# Patient Record
Sex: FEMALE | Race: WHITE | NOT HISPANIC OR LATINO | URBAN - METROPOLITAN AREA
[De-identification: names, ages, dates, MRNs, and addresses within clinical notes are randomized per-mention and may not be internally consistent; named-entity substitution may affect disease eponyms.]

---

## 2023-09-14 ENCOUNTER — OFFICE VISIT (OUTPATIENT)
Dept: PSYCHIATRY | Facility: CLINIC | Age: 33
End: 2023-09-14
Payer: OTHER GOVERNMENT

## 2023-09-14 VITALS
DIASTOLIC BLOOD PRESSURE: 88 MMHG | HEIGHT: 66 IN | HEART RATE: 74 BPM | WEIGHT: 149 LBS | SYSTOLIC BLOOD PRESSURE: 131 MMHG | BODY MASS INDEX: 23.95 KG/M2

## 2023-09-14 DIAGNOSIS — F90.9 ATTENTION DEFICIT HYPERACTIVITY DISORDER (ADHD), UNSPECIFIED ADHD TYPE: ICD-10-CM

## 2023-09-14 DIAGNOSIS — F41.1 GAD (GENERALIZED ANXIETY DISORDER): ICD-10-CM

## 2023-09-14 DIAGNOSIS — F33.1 MAJOR DEPRESSIVE DISORDER, RECURRENT EPISODE, MODERATE: Primary | ICD-10-CM

## 2023-09-14 RX ORDER — BUPROPION HYDROCHLORIDE 150 MG/1
150 TABLET ORAL EVERY MORNING
Qty: 30 TABLET | Refills: 1 | Status: SHIPPED | OUTPATIENT
Start: 2023-09-14

## 2023-09-14 RX ORDER — METHYLPREDNISOLONE ACETATE 80 MG/ML
INJECTION, SUSPENSION INTRA-ARTICULAR; INTRALESIONAL; INTRAMUSCULAR; SOFT TISSUE
COMMUNITY
Start: 2023-08-03 | End: 2023-09-14

## 2023-09-14 NOTE — PROGRESS NOTES
"[facility]  Initial Evaluation    Referring Provider:  Tanya Justin, APRN  [address] [place] [address]    Chief Complaint: \"Just having a hard time since the move... I lived in [place] for about 9 years, my  is ex-... I had therapy there... when he was out I couldn't go anymore... I had a community of friends and I was pretty involved with the community... I focused on a lot of wellness activities... to manage feeling overwhelmed... I had those resources available... and then we moved... I'm working in a different environment... I'm working third shift... it's been harder to find time to prioritize those things.\"    History of Present Illness: Rosemary Tesfaye is a 33 y.o. female who presents today for initial evaluation regarding anxious, concentration/focus, and depressive symptoms. She presents unaccompanied in no acute distress and engages with me appropriately.     History is positive for signs/symptoms suggestive of MDD, MARTI, ADHD: low mood, low energy, anhedonia, changes in sleep, changes in appetite, guilt, poor concentration, psychomotor changes, thoughts of being better off dead, consistent and excessive worry across several domains of life that contributes to tension and irritability throughout the day, trouble concentrating, trouble completing tasks, making errors in routine tasks, issues remembering obligations, trouble with organization, fidgeting, and associated irritability/anxiety with these deficits. No recent SI. Patient describes a fulfilling, engaging life in [place] that has since changed drastically since moving to [place] - she is now working third shift and is far less able to engage socially and with wellness activities that helped her to manage mood and anxiety int he past. She doesn't have as many social contacts here and isn't as in touch with wellness resources in the area as she had been in [place]. She is " motivated to see a change in herself, but feels very overwhelmed with the stress in her life at this time.     With regard to ADHD: I am presumptively treating patient for this issue; history as provided today, presentation today, and positive family history would suggest a distinct possibility this is a contributing factor to patient's dysfunction in her roles and engagements irrespective to screening results. Patient has learned and successfully implemented compensatory coping skills that, with the Congregational of layered stressors throughout adulthood, have begun to fail. Treating ADHD symptoms will likely be a concise and appropriate route to address anxiety and mood issues that are, at least to some degree, secondary to deficits related to traits of ADHD.    I have counseled the patient with regard to diagnoses and the recommended treatment regimen as documented below: I will be prescribing bupropion for MDD and ADHD. The patient has been made aware that this agent diminishes the seizure threshold and can increase risk for seizures in susceptible populations. More common SE - dry mouth, GI upset, insomnia, hypertension - have also been reviewed. I've advised antidepressants carry a possibility of exacerbating mood/anxiety issues for which they are prescribed to the degree that, in some cases, their use may lead to acute suicidality. Patient contracts for safety appropriately. Patient has been made aware of the relatively sparse data regarding use of this agent during pregnancy/breastfeeding and that individual determination of risk vs benefit must be considered in these scenarios. Patient acknowledges the diagnoses per my rendered interpretation. Patient demonstrates awareness/understanding of viable alternatives for treatment as well as potential risks, benefits, and side effects associated with this regimen and is amenable to proceed in this fashion.     Recommended lifestyle changes: increase socializing (twice  "monthly at cross country meets), brisk 30 minute walks 3 days a week.    Psychiatric History:  Diagnoses: depression, anxiety  [facility] [employer] [facility]  Inpatient history: denies  Medication trials: \"I've always kind of rejected it... wanted to prioritize other forms of management... 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  Education: bachelor's degree  Pertinent developmental history: struggled some with reading, no formal LD or developmental issues; early exposure to items beyond her level of maturity, no direct abuse hx  Pertinent legal history: denies  Hobbies/interests: farming and raising/milking goats, gardening  Sabianist: \"used to be, but no\" (this changed about two years ago); felt her earlene was around 7-8/10 non-Mormonism Denominational; gradual decline in this belief system for her thereafter  Exercise: not recently, but yes as of a few months ago  Dietary habits: defer  Sleep hygiene: defer  Social habits: hasn't had as much contact with friends or social opportunities since moving to [place]  Sunlight: no concern for under-exposure  Caffeine intake: 4 cups of coffee daily on average, tea in the winter, no soda, no energy drinks  Hydration habits: no pertinent issues   history: denies    Social History     Socioeconomic History    Marital status: Unknown   Tobacco Use    Smoking status: Never   Vaping Use    Vaping Use: Never used   Substance and Sexual Activity    Alcohol use: Yes     Alcohol/week: 3.0 standard " drinks     Types: 3 Cans of beer per week    Drug use: Not Currently     Types: Marijuana    Sexual activity: Yes     Partners: Male     Birth control/protection: I.U.D.     Access to Firearms: yes; these are her 's guns in a safe.    Tobacco use counseling/intervention: N/A, patient does not use tobacco; patient was counseled with regard to risks of tobacco use.    PHQ-9 Depression Screening  PHQ-9 Total Score: 16    Little interest or pleasure in doing things? 1-->several days   Feeling down, depressed, or hopeless? 1-->several days   Trouble falling or staying asleep, or sleeping too much? 2-->more than half the days   Feeling tired or having little energy? 3-->nearly every day   Poor appetite or overeating? 2-->more than half the days   Feeling bad about yourself - or that you are a failure or have let yourself or your family down? 2-->more than half the days   Trouble concentrating on things, such as reading the newspaper or watching television? 3-->nearly every day   Moving or speaking so slowly that other people could have noticed? Or the opposite - being so fidgety or restless that you have been moving around a lot more than usual? 2-->more than half the days   Thoughts that you would be better off dead, or of hurting yourself in some way? 0-->not at all   PHQ-9 Total Score 16     MARTI-7  Feeling nervous, anxious or on edge: Nearly every day  Not being able to stop or control worrying: Nearly every day  Worrying too much about different things: Several days  Trouble Relaxing: Several days  Being so restless that it is hard to sit still: Nearly every day  Feeling afraid as if something awful might happen: More than half the days  Becoming easily annoyed or irritable: Not at all  MARTI 7 Total Score: 13  If you checked any problems, how difficult have these problems made it for you to do your work, take care of things at home, or get along with other people: Somewhat difficult    ASRS-v1.1  Part  A  5/6  Part B  6/12    Total  11/18    Problem List:  Patient Active Problem List   Diagnosis    Major depressive disorder, recurrent episode, moderate    MARTI (generalized anxiety disorder)    Attention deficit hyperactivity disorder (ADHD)     Allergy:   No Known Allergies     Discontinued Medications:  Medications Discontinued During This Encounter   Medication Reason    methylPREDNISolone acetate (DEPO-Medrol) 80 MG/ML injection *Therapy completed     Current Medications:   Current Outpatient Medications   Medication Sig Dispense Refill    Levonorgestrel (MIRENA, 52 MG, IU) by Intrauterine route.       No current facility-administered medications for this visit.     Past Medical History:  Past Medical History:   Diagnosis Date    ADHD (attention deficit hyperactivity disorder) 10/2017    Was discussed as a diagnosis during session. I opted to avoid medicated treatment.    Anxiety 08/2017    Depression 08/2017    Had episode of Suicidal thoughts and made an appointment.  Attended bi-monthly Sessions with Therapist    Self-injurious behavior 2005    behavior continued for 2 years,     Past Surgical History:  Past Surgical History:   Procedure Laterality Date    COSMETIC SURGERY  04/2016     Family History:   Family History   Problem Relation Age of Onset    ADD / ADHD Mother         Diagnosed at 42.  Was placed on medication to alleviate symptoms, managed sobriety for two years following medical intervention.  Was no longer complaint with medication due to peer pressure.    Alcohol abuse Mother     Depression Mother     Self-Injurious Behavior  Mother     Suicide Attempts Mother         Suicide attempt via alcohol consumption    ADD / ADHD Sister         Recent diagnosis following anxiety attack    Anxiety disorder Sister     Self-Injurious Behavior  Sister      Mental Status Exam:   Appearance: well-groomed, sits upright, age-appropriate, normal habitus  Behavior: nervous, cooperative, appropriate in demeanor,  "appropriate eye-contact  Mood/affect: dysphoric / mood-congruent and appropriate in both range and amplitude  Speech: loquacious, otherwise within expected variance; appropriate rate, appropriate rhythm, appropriate tone; non-pressured  Thought Process: linear, goal-directed; no FOI or CRYSTAL; abstraction intact  Thought Content: coherent, devoid of overt delusions/perceptual disturbances  SI/HI: denies both SI and HI; exhibits future-orientation, self-advocates appropriately, no regular self-harm, no appreciable intent  Memory: no overt deficits  Orientation: oriented to person/place/time/situation  Concentration: appropriate during interview  Intellectual capacity: presumptively average  Insight: fair by given history/exam  Judgment: appropriate by given history/exam  Psychomotor: fidgets  Gait: WNL    Review of Systems:  Review of Systems   Constitutional:  Negative for activity change, appetite change and unexpected weight change.   HENT:  Negative for drooling.    Eyes:  Negative for visual disturbance.   Respiratory:  Positive for shortness of breath. Negative for chest tightness.    Cardiovascular:  Negative for chest pain and palpitations.   Gastrointestinal:  Negative for abdominal pain, diarrhea and nausea.   Endocrine: Negative for cold intolerance and heat intolerance.   Genitourinary:  Negative for difficulty urinating and frequency.   Musculoskeletal:  Negative for myalgias and neck stiffness.   Skin:  Negative for rash.   Neurological:  Negative for dizziness, tremors, seizures and light-headedness.      Vital Signs:   /88   Pulse 74   Ht 167.6 cm (66\")   Wt 67.6 kg (149 lb)   BMI 24.05 kg/m²      Lab Results:   No visits with results within 6 Month(s) from this visit.   Latest known visit with results is:   No results found for any previous visit.     EKG Results:  No orders to display     Imaging Results:  No Images in the past 120 days found.    ASSESSMENT AND PLAN:    ICD-10-CM ICD-9-CM "   1. Major depressive disorder, recurrent episode, moderate  F33.1 296.32   2. MARTI (generalized anxiety disorder)  F41.1 300.02   3. Attention deficit hyperactivity disorder (ADHD), unspecified ADHD type  F90.9 314.01     33 y.o. female who presents today for initial evaluation regarding anxious and depressive symptoms. We have discussed the history and interpreted diagnoses as above as well as the treatment plan below, including potential R/B/SE of the recommended regimen of which the patient demonstrates understanding. Patient is agreeable to call 911 or go to the nearest ER should she become concerned for her own safety and/or the safety of those around her. There are no overt indices of acute mona/psychosis on evaluation today.     Medication regimen: begin bupropion  mg QAM; patient is advised not to misuse prescribed medications or to use any exogenous substances that aren't disclosed to this provider as they may interact with the regimen to her detriment.   Risk Assessment: protracted risk is low, imminent risk is low.  Risk factors include: anxiety disorder, mood disorder, and recent/ongoing psychosocial stressors. Protective factors include: intact reality testing, no substance use disorder, no present SI, no stated history of suicide attempts or self-harm, patient's exhibited future-orientation, strong social support, and patient's cooperation with care. Do note that this is subject to change with the Evangelical of new stressors, treatment non-adherence, use of substances, and/or new medical ails.  Monitoring: reports CBC, CMP, TSH were WNL; PHQ-9 today is 16/27, MARTI-7 today is 13/21, ASRS today 11/18.  Therapy: refer to [facility]  Follow-up: 1 month  Communications: N/A    TREATMENT PLAN/GOALS: challenge patterns of living conducive to symptom burden, implement recommended regimen as above with augmentative, intermittent supportive psychotherapy to reduce symptom burden. Patient acknowledged and  verbally consented to begin treatment as above. The importance of adherence to the recommended treatment and interval follow-up appointments was emphasized today. Patient was today advised to limit daily caffeine intake, hydrate appropriately, eat healthy and nutritious foods, engage sleep hygiene measures, engage appropriate exposure to sunlight, engage with hobbies in balance with life necessities, and exercise appropriate to their capacity to do so.     Billing: I have seen the patient today and considered her psychiatric complaints, rendered a diagnosis, and discussed treatment with the patient as above with which she consents.    Parts of this note are electronic transcriptions/translations of spoken language to printed text using the Dragon Dictation system.    Electronically signed by Eddie Linares MD, 09/14/23, [id number]

## 2023-09-14 NOTE — TREATMENT PLAN
Multi-Disciplinary Problems (from Behavioral Health Treatment Plan)      Active Problems       Problem: Anxiety  Start Date: 09/14/23      Problem Details: The patient self-scales this problem as a 6 with 10 being the worst.          Goal Priority Start Date Expected End Date End Date    Patient will develop and implement behavioral and cognitive strategies to reduce anxiety and irrational fears. -- 09/14/23 -- --    Goal Details: Progress toward goal:  Not appropriate to rate progress toward goal since this is the initial treatment plan.          Goal Intervention Frequency Start Date End Date    Help patient explore past emotional issues in relation to present anxiety. PRN 09/14/23 --    Intervention Details: Duration of treatment until until remission of symptoms.          Goal Intervention Frequency Start Date End Date    Help patient develop an awareness of their cognitive and physical responses to anxiety. PRN 09/14/23 --    Intervention Details: Duration of treatment until until remission of symptoms.                  Problem: Depression  Start Date: 09/14/23      Problem Details: The patient self-scales this problem as a 6 with 10 being the worst.          Goal Priority Start Date Expected End Date End Date    Patient will demonstrate the ability to initiate new constructive life skills outside of sessions on a consistent basis. -- 09/14/23 -- --    Goal Details: Progress toward goal:  Not appropriate to rate progress toward goal since this is the initial treatment plan.          Goal Intervention Frequency Start Date End Date    Assist patient in setting attainable activities of daily living goals. PRN 09/14/23 --      Goal Intervention Frequency Start Date End Date    Provide education about depression PRN 09/14/23 --    Intervention Details: Duration of treatment until until remission of symptoms.          Goal Intervention Frequency Start Date End Date    Assist patient in developing healthy coping  strategies. PRN 09/14/23 --    Intervention Details: Duration of treatment until until remission of symptoms.                  Problem: ADHD (Adult)  Start Date: 09/14/23      Problem Details: The patient self-scales this problem as a 6 with 10 being the worst.        Goal Priority Start Date Expected End Date End Date    Patient will sustain attention and concentration to complete chores, and work responsibilites and increase positive interaction in all relationships. -- 09/14/23 -- --    Goal Details: Progress toward goal:  Not appropriate to rate progress toward goal since this is the initial treatment plan.        Goal Intervention Frequency Start Date End Date    Assist patient in setting responsible goals and breaking down large tasks. PRN 09/14/23 --    Intervention Details: Duration of treatment until until remission of symptoms.        Goal Intervention Frequency Start Date End Date    Assist patient in using self monitoring checklist to improve attention, work performance, and social skills. PRN 09/14/23 --    Intervention Details: Duration of treatment until until remission of symptoms.                               I have discussed and reviewed this treatment plan with the patient.

## 2023-10-18 ENCOUNTER — TELEPHONE (OUTPATIENT)
Dept: PSYCHIATRY | Facility: CLINIC | Age: 33
End: 2023-10-18
Payer: OTHER GOVERNMENT

## 2023-10-18 DIAGNOSIS — F33.1 MAJOR DEPRESSIVE DISORDER, RECURRENT EPISODE, MODERATE: ICD-10-CM

## 2023-10-18 RX ORDER — BUPROPION HYDROCHLORIDE 300 MG/1
300 TABLET ORAL EVERY MORNING
Qty: 30 TABLET | Refills: 0 | Status: SHIPPED | OUTPATIENT
Start: 2023-10-18

## 2023-10-18 NOTE — TELEPHONE ENCOUNTER
"PT LEFT MESSAGE WITH [facility] STATING THAT \"CHANGING DOSAGE ON MEDS.\"    PT HAS BEEN ON WELLBUTRIN FOR ALMOST A MONTH NOW. PT STATED OVER THE PAST FEW WEEKS SHE HAS BEEN CRYING A LOT MORE NOW; PT STATED SHE FELT SHE WAS IN A REALLY DARK PLACE THOUGH SHE HAD SUPPORT FROM FRIENDS AND FAMILY AND WAS ABLE TO COME OUT OF THAT; THIS RECENT WEEK SHE'S NOT SLEEPING AS GOOD AS SHE WAS; SHE'S BEEN MORE CATNAPPING.    PT IS UNSURE IF SHE MIGHT NEED TO CHANGE THE DOSAGE; CHANGE THE MEDICATION.    PROVIDER PLEASE ADVISE AS PT IS NOT TO BE SEEN UNTIL 11/03/2023.    "

## 2023-10-19 NOTE — TELEPHONE ENCOUNTER
I CALLED PT TO INFORM OF THIS INCREASED DOSAGE AMOUNT BEING SENT INTO PHARMACY.    PT DID NOT ANSWER. I LEFT A VOICEMAIL REQUESTING THAT PT CALL OUR OFFICE BACK.

## 2023-10-19 NOTE — TELEPHONE ENCOUNTER
PT CALLED AND LEFT VOICEMAIL RETURNING OUR CALL.    I CALLED PT BACK AND SPOKE TO PT.    INFORMED PT OF MEDICATION INCREASE AND IT HAD BEEN SENT TO PHARMACY.    PT EXPRESSED UNDERSTANDING AND CONFIRMED WHEN HER NEXT APPT IS WITH PROVIDER.    ALL QUESTIONS ANSWERED AT THIS TIME.   Labs/EKG/Imaging Studies/Medications

## 2023-11-02 NOTE — PROGRESS NOTES
"[facility]  Follow-up Visit    Chief Complaint: \"Just having a hard time since the move... I lived in [place] for about 9 years, my  is ex-... I had therapy there... when he was out I couldn't go anymore... I had a community of friends and I was pretty involved with the community... I focused on a lot of wellness activities... to manage feeling overwhelmed... I had those resources available... and then we moved... I'm working in a different environment... I'm working third shift... it's been harder to find time to prioritize those things.\"     History of Present Illness: Rosemary Tesfaye is a 33 y.o. female who presents today for follow-up regarding MDD, MARTI, ADHD. Last seen: 09/14 at which time bupropion was started; this has been titrated in the interim. She presents unaccompanied in no acute distress and engages with me appropriately. Psychotropic regimen perceived to be partially effective. Side-effects per given history: initial mood lability that has abated since dose titration.    Current treatment regimen includes:   - bupropion 300 mg QD    Today the patient feels well. Overall, she reports her depression symptoms have improved with current interventions, but only to a degree. She's not having hopelessness or suicidal thoughts, but does continue to struggle with low motivation and dysphoria. . Thought process and content are devoid of overt aberration suggestive of acute mona/psychosis. The patient denies SI/HI/AVH. There are no overt changes on exam today compared to most recent evaluation.  - contextual changes: had an eventful night in which a patient coded and was resuscitated; has not yet been in touch with therapist; has been more engaged socially; walked a bit more the first few weeks (has struggled with this more recently)  - sleep: improved  - appetite: no change    I have counseled the patient with regard to diagnoses and the recommended " treatment regimen as documented below: I will be prescribing amphetamine for ADHD. Patient consents to UDS and CSA today. Patient has been made aware of the long-term risks of tachyphylaxis/dependence and uncomfortable withdrawal that may occur with abrupt discontinuation of this agent. I have advised taking medication holidays to mitigate risk of dependence and tolerance and have advised the patient with regard to common psychological dependence that can contribute to perceived diminishment in treatment effectiveness. Patient has been advised to monitor and limit caffeine intake while taking this agent. Patient has been made aware of common SE - diminished appetite, insomnia, hypertension - for which this agent has propensity. I have specifically reviewed issues related to misuse and diversion with the patient today. Patient acknowledges the diagnoses per my rendered interpretation. Patient demonstrates understanding of potential risks/benefits/side effects associated with this regimen and is amenable to proceed in this fashion.     Psychotherapy  - Time: 19 minutes  - interventions employed: the therapeutic alliance was strengthened to encourage the patient to express their thoughts and feelings freely. Esteem building was enhanced through praise, reassurance, normalizing/challenging, and encouragement as appropriate. Coping skills were enhanced to build distress tolerance skills and emotional regulation. Allowed patient to freely discuss issues without interruption or judgement with unconditional positive regard, active listening skills, and empathy. Provided a safe, confidential environment to facilitate the development of a positive therapeutic relationship and encourage open, honest communication. Assisted patient in processing session content; acknowledged and normalized/addressed, as appropriate, patient’s thoughts, feelings, and concerns by utilizing a person-centered approach in efforts to build  "appropriate rapport and a positive therapeutic relationship with open and honest communication.   - Diagnoses: see assessment and plan below  - Symptoms: see subjective above  - Goals   - patient: improve mood, improve concentration/focus, mitigate overwhelm and bolster allostatic threshold, mitigate anxiety   - provider: challenge patterns of living conducive to pathology, strengthen defenses, promote problems solving, restore adaptive functioning and provide symptom relief.  - Treatment plan: continue supportive psychotherapy in subsequent appointments to provide symptom relief; see assessment and plan below for additional details:   - iteration: 1   - progress: partial   - (X)illumination, (X)contextualization, (working)detection, (working)development, (-)elaboration, (-)refinement  - functional status: fair  - mental status exam: as below  - prognosis: good    Psychiatric History:  Diagnoses: depression, anxiety  [facility] [employer] [facility]  Inpatient history: denies  Medication trials: \"I've always kind of rejected it... wanted to prioritize other forms of management... 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  Education: bachelor's degree  Pertinent developmental history: struggled some with reading, no formal LD or developmental issues; early exposure to items beyond her level of maturity, no direct abuse hx  Pertinent legal history: denies  Hobbies/interests: farming and raising/milking goats, gardening  Orthodoxy: \"used to " "be, but no\" (this changed about two years ago); felt her earlene was around 7-8/10 non-Sikhism Religious; gradual decline in this belief system for her thereafter  Exercise: not recently, but yes as of a few months ago  Dietary habits: defer  Sleep hygiene: defer  Social habits: hasn't had as much contact with friends or social opportunities since moving to [place]  Sunlight: no concern for under-exposure  Caffeine intake: 4 cups of coffee daily on average, tea in the winter, no soda, no energy drinks  Hydration habits: no pertinent issues   history: denies    Social History     Socioeconomic History    Marital status: Unknown   Tobacco Use    Smoking status: Never    Smokeless tobacco: Never   Vaping Use    Vaping Use: Never used   Substance and Sexual Activity    Alcohol use: Yes     Alcohol/week: 3.0 standard drinks of alcohol     Types: 3 Cans of beer per week    Drug use: Not Currently     Types: Marijuana    Sexual activity: Yes     Partners: Male     Birth control/protection: I.U.D.     Tobacco use counseling/intervention: N/A, patient does not use tobacco; patient has been counseled with regard to risks of tobacco use.    PHQ-9 Depression Screening  PHQ-9 Total Score:      Little interest or pleasure in doing things?     Feeling down, depressed, or hopeless?     Trouble falling or staying asleep, or sleeping too much?     Feeling tired or having little energy?     Poor appetite or overeating?     Feeling bad about yourself - or that you are a failure or have let yourself or your family down?     Trouble concentrating on things, such as reading the newspaper or watching television?     Moving or speaking so slowly that other people could have noticed? Or the opposite - being so fidgety or restless that you have been moving around a lot more than usual?     Thoughts that you would be better off dead, or of hurting yourself in some way?     PHQ-9 Total Score       Change in PHQ-9 since last measure: " N/A (16)    MARTI-7       Change in MARTI-7 since last measure: N/A (13)    Problem List:  Patient Active Problem List   Diagnosis    Major depressive disorder, recurrent episode, moderate    MARTI (generalized anxiety disorder)    Attention deficit hyperactivity disorder (ADHD)     Allergy:   No Known Allergies     Discontinued Medications:  There are no discontinued medications.    Current Medications:   Current Outpatient Medications   Medication Sig Dispense Refill    buPROPion XL (Wellbutrin XL) 300 MG 24 hr tablet Take 1 tablet by mouth Every Morning. 30 tablet 0    Levonorgestrel (MIRENA, 52 MG, IU) by Intrauterine route.       No current facility-administered medications for this visit.     Past Medical History:  Past Medical History:   Diagnosis Date    ADHD (attention deficit hyperactivity disorder) 10/2017    Was discussed as a diagnosis during session. I opted to avoid medicated treatment.    Anxiety 08/2017    Depression 08/2017    Had episode of Suicidal thoughts and made an appointment.  Attended bi-monthly Sessions with Therapist    Self-injurious behavior 2005    behavior continued for 2 years,     Past Surgical History:  Past Surgical History:   Procedure Laterality Date    COSMETIC SURGERY  04/2016     Mental Status Exam:   Appearance: well-groomed, sits upright, age-appropriate, normal habitus  Behavior: calm, cooperative, appropriate in demeanor, appropriate eye-contact  Mood/affect: dysphoric / mood-congruent and appropriate in both range and amplitude  Speech: within expected variance; appropriate rate, appropriate rhythm, appropriate tone; non-pressured  Thought Process: linear, goal-directed; no FOI or CRYSTAL; abstraction intact  Thought Content: coherent, devoid of overt delusions/perceptual disturbances  SI/HI: denies both SI and HI; exhibits future-orientation, self-advocates appropriately, no regular self-harm, no appreciable intent  Memory: no overt deficits  Orientation: oriented to  "person/place/time/situation  Concentration: appropriate during interview  Intellectual capacity: presumptively average  Insight: fair by given history/exam  Judgment: appropriate by given history/exam  Psychomotor: no appreciable latency/retardation/agitation/tremor  Gait: WNL    Review of Systems:  Review of Systems   Constitutional:  Positive for activity change. Negative for appetite change and unexpected weight change.   HENT:  Negative for drooling.    Eyes:  Negative for visual disturbance.   Respiratory:  Negative for chest tightness and shortness of breath.    Cardiovascular:  Negative for chest pain and palpitations.   Gastrointestinal:  Negative for abdominal pain, diarrhea and nausea.   Endocrine: Negative for cold intolerance and heat intolerance.   Genitourinary:  Negative for difficulty urinating and frequency.   Musculoskeletal:  Negative for myalgias and neck stiffness.   Skin:  Negative for rash.   Neurological:  Positive for tremors. Negative for dizziness, seizures and light-headedness.     Vital Signs:   /83   Pulse 80   Ht 167.6 cm (66\")   Wt 67.1 kg (148 lb)   BMI 23.89 kg/m²      Lab Results:   No visits with results within 6 Month(s) from this visit.   Latest known visit with results is:   No results found for any previous visit.     EKG Results:  No orders to display     Imaging Results:  No Images in the past 120 days found.    ASSESSMENT AND PLAN:    ICD-10-CM ICD-9-CM   1. Attention deficit hyperactivity disorder (ADHD), unspecified ADHD type  F90.9 314.01   2. Major depressive disorder, recurrent episode, moderate  F33.1 296.32   3. MARTI (generalized anxiety disorder)  F41.1 300.02     33 y.o. female who presents today for follow-up regarding MDD, MARTI, ADHD. We have discussed the interval history and the treatment plan below, including potential R/B/SE of the recommended regimen of which the patient demonstrates understanding. Patient is agreeable to call 911 or go to the " nearest ER should she become concerned for her own safety and/or the safety of those around her. There are no overt indices of acute mona/psychosis on exam today. ISAAC reviewed and as expected.    Medication regimen: begin amphetamine 10 mg QAM, continue bupropion; patient is advised not to misuse prescribed medications or to use them with any exogenous substances that aren't disclosed to this provider as they may interact with the regimen to the patient's detriment.   Risk Assessment: protracted risk is low, imminent risk is low - no interval change. Do note that this is subject to change with the Jehovah's witness of new stressors, treatment non-adherence, use of substances, and/or new medical ails.   Monitoring: UDS ordered  Therapy: referred  Follow-up: 1 month  Communications: N/A    TREATMENT PLAN/GOALS: challenge patterns of living conducive to symptom burden, implement recommended regimen as above with augmentative, intermittent supportive psychotherapy to reduce symptom burden. Patient acknowledged and verbally consented to continue treatment. The importance of adherence to the recommended treatment and interval follow-up appointments was again emphasized today: patient has good treatment adherence per given history. Patient was today reminded to limit daily caffeine intake, hydrate appropriately, eat healthy and nutritious foods, engage sleep hygiene measures, engage appropriate exposure to sunlight, engage with hobbies in balance with life necessities, and exercise appropriate to their capacity to do so.     Billing: This encounter is of moderate complexity based on number/complexity of problems addressed today and risk of complications/morbidity: 2+ stable chronic illnesses and prescription management. Additionally, I provided 19 minutes of dedicated psychotherapy to the patient as documented above.    Parts of this note are electronic transcriptions/translations of spoken language to printed text using the  Dragon Dictation system.    Electronically signed by Eddie Linares MD, 11/02/23, 1015

## 2023-11-03 ENCOUNTER — OFFICE VISIT (OUTPATIENT)
Dept: PSYCHIATRY | Facility: CLINIC | Age: 33
End: 2023-11-03
Payer: OTHER GOVERNMENT

## 2023-11-03 VITALS
DIASTOLIC BLOOD PRESSURE: 83 MMHG | SYSTOLIC BLOOD PRESSURE: 125 MMHG | HEIGHT: 66 IN | WEIGHT: 148 LBS | HEART RATE: 80 BPM | BODY MASS INDEX: 23.78 KG/M2

## 2023-11-03 DIAGNOSIS — F33.1 MAJOR DEPRESSIVE DISORDER, RECURRENT EPISODE, MODERATE: ICD-10-CM

## 2023-11-03 DIAGNOSIS — F41.1 GAD (GENERALIZED ANXIETY DISORDER): ICD-10-CM

## 2023-11-03 DIAGNOSIS — F90.9 ATTENTION DEFICIT HYPERACTIVITY DISORDER (ADHD), UNSPECIFIED ADHD TYPE: Primary | ICD-10-CM

## 2023-11-03 DIAGNOSIS — Z51.81 THERAPEUTIC DRUG MONITORING: ICD-10-CM

## 2023-11-03 RX ORDER — BUPROPION HYDROCHLORIDE 300 MG/1
300 TABLET ORAL EVERY MORNING
Qty: 30 TABLET | Refills: 0 | Status: SHIPPED | OUTPATIENT
Start: 2023-11-03

## 2023-11-03 RX ORDER — DEXTROAMPHETAMINE SACCHARATE, AMPHETAMINE ASPARTATE, DEXTROAMPHETAMINE SULFATE AND AMPHETAMINE SULFATE 2.5; 2.5; 2.5; 2.5 MG/1; MG/1; MG/1; MG/1
10 TABLET ORAL DAILY
Qty: 30 TABLET | Refills: 0 | Status: SHIPPED | OUTPATIENT
Start: 2023-11-03

## 2023-11-07 ENCOUNTER — LAB (OUTPATIENT)
Dept: LAB | Facility: HOSPITAL | Age: 33
End: 2023-11-07
Payer: OTHER GOVERNMENT

## 2023-11-07 LAB
AMPHET+METHAMPHET UR QL: NEGATIVE
BARBITURATES UR QL SCN: NEGATIVE
BENZODIAZ UR QL SCN: NEGATIVE
CANNABINOIDS SERPL QL: NEGATIVE
COCAINE UR QL: NEGATIVE
FENTANYL UR-MCNC: NEGATIVE NG/ML
METHADONE UR QL SCN: NEGATIVE
OPIATES UR QL: NEGATIVE
OXYCODONE UR QL SCN: NEGATIVE

## 2023-11-07 PROCEDURE — 80307 DRUG TEST PRSMV CHEM ANLYZR: CPT | Performed by: PSYCHIATRY & NEUROLOGY

## 2023-11-08 DIAGNOSIS — F33.1 MAJOR DEPRESSIVE DISORDER, RECURRENT EPISODE, MODERATE: ICD-10-CM

## 2023-11-08 DIAGNOSIS — F90.9 ATTENTION DEFICIT HYPERACTIVITY DISORDER (ADHD), UNSPECIFIED ADHD TYPE: ICD-10-CM

## 2023-11-08 RX ORDER — BUPROPION HYDROCHLORIDE 300 MG/1
300 TABLET ORAL EVERY MORNING
Qty: 30 TABLET | Refills: 0 | Status: SHIPPED | OUTPATIENT
Start: 2023-11-08

## 2023-11-08 RX ORDER — DEXTROAMPHETAMINE SACCHARATE, AMPHETAMINE ASPARTATE, DEXTROAMPHETAMINE SULFATE AND AMPHETAMINE SULFATE 2.5; 2.5; 2.5; 2.5 MG/1; MG/1; MG/1; MG/1
10 TABLET ORAL DAILY
Qty: 30 TABLET | Refills: 0 | Status: SHIPPED | OUTPATIENT
Start: 2023-11-08

## 2023-11-08 NOTE — TELEPHONE ENCOUNTER
PT(PATIENT) VERIFIED     amphetamine-dextroamphetamine (ADDERALL) 10 MG tablet (2023)   buPROPion XL (Wellbutrin XL) 300 MG 24 hr tablet (2023)     PT(PATIENT) STATES PHARMACY IS OUT OF STOCK     PT(PATIENT) IS REQUESTING FOR MEDICATION TO BE RESENT TO [facility]     NEXT APPT WITH PROVIDER  Appointment with Eddie Linares MD (2023)     PROVIDER PLEASE ADVISE

## 2023-12-05 ENCOUNTER — OFFICE VISIT (OUTPATIENT)
Dept: PSYCHIATRY | Facility: CLINIC | Age: 33
End: 2023-12-05
Payer: OTHER GOVERNMENT

## 2023-12-05 VITALS
BODY MASS INDEX: 24.11 KG/M2 | SYSTOLIC BLOOD PRESSURE: 129 MMHG | HEART RATE: 84 BPM | DIASTOLIC BLOOD PRESSURE: 82 MMHG | WEIGHT: 150 LBS | HEIGHT: 66 IN

## 2023-12-05 DIAGNOSIS — F90.9 ATTENTION DEFICIT HYPERACTIVITY DISORDER (ADHD), UNSPECIFIED ADHD TYPE: Primary | ICD-10-CM

## 2023-12-05 DIAGNOSIS — F41.1 GAD (GENERALIZED ANXIETY DISORDER): ICD-10-CM

## 2023-12-05 DIAGNOSIS — F33.1 MAJOR DEPRESSIVE DISORDER, RECURRENT EPISODE, MODERATE: ICD-10-CM

## 2023-12-05 PROCEDURE — 99214 OFFICE O/P EST MOD 30 MIN: CPT | Performed by: PSYCHIATRY & NEUROLOGY

## 2023-12-05 RX ORDER — DEXTROAMPHETAMINE SACCHARATE, AMPHETAMINE ASPARTATE, DEXTROAMPHETAMINE SULFATE AND AMPHETAMINE SULFATE 2.5; 2.5; 2.5; 2.5 MG/1; MG/1; MG/1; MG/1
10 TABLET ORAL 2 TIMES DAILY
Qty: 60 TABLET | Refills: 0 | Status: SHIPPED | OUTPATIENT
Start: 2024-01-04

## 2023-12-05 RX ORDER — DEXTROAMPHETAMINE SACCHARATE, AMPHETAMINE ASPARTATE, DEXTROAMPHETAMINE SULFATE AND AMPHETAMINE SULFATE 2.5; 2.5; 2.5; 2.5 MG/1; MG/1; MG/1; MG/1
10 TABLET ORAL 2 TIMES DAILY
Qty: 60 TABLET | Refills: 0 | Status: SHIPPED | OUTPATIENT
Start: 2023-12-05

## 2023-12-05 RX ORDER — BUPROPION HYDROCHLORIDE 300 MG/1
300 TABLET ORAL EVERY MORNING
Qty: 90 TABLET | Refills: 0 | Status: SHIPPED | OUTPATIENT
Start: 2023-12-05

## 2023-12-05 NOTE — PROGRESS NOTES
"[facility]  Follow-up Visit    Chief Complaint: \"Just having a hard time since the move... I lived in [place] for about 9 years, my  is ex-... I had therapy there... when he was out I couldn't go anymore... I had a community of friends and I was pretty involved with the community... I focused on a lot of wellness activities... to manage feeling overwhelmed... I had those resources available... and then we moved... I'm working in a different environment... I'm working third shift... it's been harder to find time to prioritize those things.\"     History of Present Illness: Rosemary Tesfaye is a 33 y.o. female who presents today for follow-up regarding MDD, MARTI, ADHD. Last seen: 11/03 at which time amphetamine was started. She presents unaccompanied in no acute distress and engages with me appropriately. Psychotropic regimen perceived to be partially effective. Side-effects per given history: mild xerostomia and irritability (resolved), afternoon drowsiness.    Current treatment regimen includes:   - bupropion 300 mg QD  - amphetamine 10 mg QAM    Today the patient feels well, feels she's had some improvements. Depression symptoms are adequately managed with current interventions. Anxiety symptoms are better managed with current interventions. ADHD symptoms are better managed with current interventions; however, she has been having an issue about 4 hours after her morning dose when the effects of the medication begin to wear off and she begins feeling very drowsy. Thought process and content are devoid of overt aberration suggestive of acute mona/psychosis. The patient denies SI/HI/AVH. There are no overt changes on exam today compared to most recent evaluation.  - contextual changes: has tried to be more active recently (sustained an injury); got a new horse that keeps her busy and active; hasn't had any return calls from a therapist  - sleep: generally " adequate  - appetite: generally adequate, no change    I have counseled the patient with regard to diagnoses and the recommended treatment regimen as documented below. Patient acknowledges the diagnoses per my rendered interpretation. Patient demonstrates understanding of potential risks/benefits/side effects associated with this regimen and is amenable to proceed in this fashion.     Psychotherapy  - Time: 12 minutes  - interventions employed: the therapeutic alliance was strengthened to encourage the patient to express their thoughts and feelings freely. Esteem building was enhanced through praise, reassurance, normalizing/challenging, and encouragement as appropriate. Coping skills were enhanced to build distress tolerance skills and emotional regulation. Allowed patient to freely discuss issues without interruption or judgement with unconditional positive regard, active listening skills, and empathy. Provided a safe, confidential environment to facilitate the development of a positive therapeutic relationship and encourage open, honest communication. Assisted patient in processing session content; acknowledged and normalized/addressed, as appropriate, patient’s thoughts, feelings, and concerns by utilizing a person-centered approach in efforts to build appropriate rapport and a positive therapeutic relationship with open and honest communication.   - Diagnoses: see assessment and plan below  - Symptoms: see subjective above  - Goals   - patient: improve mood, improve concentration/focus, mitigate overwhelm and bolster allostatic threshold, mitigate anxiety   - provider: challenge patterns of living conducive to pathology, strengthen defenses, promote problems solving, restore adaptive functioning and provide symptom relief.  - Treatment plan: continue supportive psychotherapy in subsequent appointments to provide symptom relief; see assessment and plan below for additional details:   - iteration: 1   - progress:  "partial   - (X)illumination, (X)contextualization, (working)detection, (working)development, (-)elaboration, (-)refinement  - functional status: fair  - mental status exam: as below  - prognosis: good    Psychiatric History:  Diagnoses: depression, anxiety  [facility] [employer] [facility]  Inpatient history: denies  Medication trials: \"I've always kind of rejected it... wanted to prioritize other forms of management... 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  Education: bachelor's degree  Pertinent developmental history: struggled some with reading, no formal LD or developmental issues; early exposure to items beyond her level of maturity, no direct abuse hx  Pertinent legal history: denies  Hobbies/interests: farming and raising/milking goats, gardening  Denominational: \"used to be, but no\" (this changed about two years ago); felt her earlene was around 7-8/10 [employer]; gradual decline in this belief system for her thereafter  Exercise: not recently, but yes as of a few months ago  Dietary habits: defer  Sleep hygiene: defer  Social habits: hasn't had as much contact with friends or social opportunities since moving to [place]  Sunlight: no concern for under-exposure  Caffeine intake: 4 cups of coffee daily on average, tea in the winter, no soda, no energy drinks  Hydration habits: no pertinent issues   history: denies    Social History     Socioeconomic History    Marital status: Unknown   Tobacco Use    Smoking " status: Never    Smokeless tobacco: Never   Vaping Use    Vaping Use: Never used   Substance and Sexual Activity    Alcohol use: Yes     Alcohol/week: 3.0 standard drinks of alcohol     Types: 3 Cans of beer per week    Drug use: Not Currently     Types: Marijuana    Sexual activity: Yes     Partners: Male     Birth control/protection: I.U.D.     Tobacco use counseling/intervention: N/A, patient does not use tobacco; patient has been counseled with regard to risks of tobacco use.    PHQ-9 Depression Screening  PHQ-9 Total Score:      Little interest or pleasure in doing things?     Feeling down, depressed, or hopeless?     Trouble falling or staying asleep, or sleeping too much?     Feeling tired or having little energy?     Poor appetite or overeating?     Feeling bad about yourself - or that you are a failure or have let yourself or your family down?     Trouble concentrating on things, such as reading the newspaper or watching television?     Moving or speaking so slowly that other people could have noticed? Or the opposite - being so fidgety or restless that you have been moving around a lot more than usual?     Thoughts that you would be better off dead, or of hurting yourself in some way?     PHQ-9 Total Score       Change in PHQ-9 since last measure: N/A (16)    MARTI-7       Change in MARTI-7 since last measure: N/A (13)    Problem List:  Patient Active Problem List   Diagnosis    Major depressive disorder, recurrent episode, moderate    MARTI (generalized anxiety disorder)    Attention deficit hyperactivity disorder (ADHD)     Allergy:   No Known Allergies     Discontinued Medications:  There are no discontinued medications.    Current Medications:   Current Outpatient Medications   Medication Sig Dispense Refill    amphetamine-dextroamphetamine (ADDERALL) 10 MG tablet Take 1 tablet by mouth Daily. 30 tablet 0    buPROPion XL (Wellbutrin XL) 300 MG 24 hr tablet Take 1 tablet by mouth Every Morning. 30 tablet 0     Levonorgestrel (MIRENA, 52 MG, IU) by Intrauterine route.       No current facility-administered medications for this visit.     Past Medical History:  Past Medical History:   Diagnosis Date    ADHD (attention deficit hyperactivity disorder) 10/2017    Was discussed as a diagnosis during session. I opted to avoid medicated treatment.    Anxiety 08/2017    Depression 08/2017    Had episode of Suicidal thoughts and made an appointment.  Attended bi-monthly Sessions with Therapist    Self-injurious behavior 2005    behavior continued for 2 years,     Past Surgical History:  Past Surgical History:   Procedure Laterality Date    COSMETIC SURGERY  04/2016     Mental Status Exam:   Appearance: well-groomed, sits upright, age-appropriate, normal habitus  Behavior: calm, cooperative, appropriate in demeanor, appropriate eye-contact  Mood/affect: dysphoric / mood-congruent and appropriate in both range and amplitude  Speech: within expected variance; appropriate rate, appropriate rhythm, appropriate tone; non-pressured  Thought Process: linear, goal-directed; no FOI or CRYSTAL; abstraction intact  Thought Content: coherent, devoid of overt delusions/perceptual disturbances  SI/HI: denies both SI and HI; exhibits future-orientation, self-advocates appropriately, no regular self-harm, no appreciable intent  Memory: no overt deficits  Orientation: oriented to person/place/time/situation  Concentration: appropriate during interview  Intellectual capacity: presumptively average  Insight: fair by given history/exam  Judgment: appropriate by given history/exam  Psychomotor: no appreciable latency/retardation/agitation/tremor  Gait: WNL    Review of Systems:  Review of Systems   Constitutional:  Negative for activity change, appetite change and unexpected weight change.   HENT:  Negative for drooling.    Eyes:  Negative for visual disturbance.   Respiratory:  Negative for chest tightness and shortness of breath.    Cardiovascular:   "Negative for chest pain and palpitations.   Gastrointestinal:  Negative for abdominal pain, diarrhea and nausea.   Endocrine: Negative for cold intolerance and heat intolerance.   Genitourinary:  Negative for difficulty urinating and frequency.   Musculoskeletal:  Negative for myalgias and neck stiffness.   Skin:  Negative for rash.   Neurological:  Negative for dizziness, tremors, seizures and light-headedness.     Vital Signs:   /82   Pulse 84   Ht 167.6 cm (66\")   Wt 68 kg (150 lb)   BMI 24.21 kg/m²      Lab Results:   Office Visit on 11/03/2023   Component Date Value Ref Range Status    Amphet/Methamphet, Screen 11/07/2023 Negative  Negative Final    Barbiturates Screen, Urine 11/07/2023 Negative  Negative Final    Benzodiazepine Screen, Urine 11/07/2023 Negative  Negative Final    Cocaine Screen, Urine 11/07/2023 Negative  Negative Final    Opiate Screen 11/07/2023 Negative  Negative Final    THC, Screen, Urine 11/07/2023 Negative  Negative Final    Methadone Screen, Urine 11/07/2023 Negative  Negative Final    Oxycodone Screen, Urine 11/07/2023 Negative  Negative Final    Fentanyl, Urine 11/07/2023 Negative  Negative Final     EKG Results:  No orders to display     Imaging Results:  No Images in the past 120 days found.    ASSESSMENT AND PLAN:    ICD-10-CM ICD-9-CM   1. Attention deficit hyperactivity disorder (ADHD), unspecified ADHD type  F90.9 314.01   2. Major depressive disorder, recurrent episode, moderate  F33.1 296.32   3. MARTI (generalized anxiety disorder)  F41.1 300.02     33 y.o. female who presents today for follow-up regarding MDD, MARTI, ADHD. We have discussed the interval history and the treatment plan below, including potential R/B/SE of the recommended regimen of which the patient demonstrates understanding. Patient is agreeable to call 911 or go to the nearest ER should she become concerned for her own safety and/or the safety of those around her. There are no overt indices of acute " mona/psychosis on exam today. ISAAC reviewed and as expected.    Medication regimen: titrate amphetamine to 10 mg BID, continue bupropion; patient is advised not to misuse prescribed medications or to use them with any exogenous substances that aren't disclosed to this provider as they may interact with the regimen to the patient's detriment.   Risk Assessment: protracted risk is low, imminent risk is low - no interval change. Do note that this is subject to change with the Jehovah's witness of new stressors, treatment non-adherence, use of substances, and/or new medical ails.   Monitoring: reviewed labs as above  Therapy: refer El[facility]  Follow-up: 6 weeks  Communications: N/A    TREATMENT PLAN/GOALS: challenge patterns of living conducive to symptom burden, implement recommended regimen as above with augmentative, intermittent supportive psychotherapy to reduce symptom burden. Patient acknowledged and verbally consented to continue treatment. The importance of adherence to the recommended treatment and interval follow-up appointments was again emphasized today: patient has good treatment adherence per given history. Patient was today reminded to limit daily caffeine intake, hydrate appropriately, eat healthy and nutritious foods, engage sleep hygiene measures, engage appropriate exposure to sunlight, engage with hobbies in balance with life necessities, and exercise appropriate to their capacity to do so.     Billing: This encounter is of moderate complexity based on number/complexity of problems addressed today and risk of complications/morbidity: 2+ stable chronic illnesses and prescription management.     Parts of this note are electronic transcriptions/translations of spoken language to printed text using the Dragon Dictation system.    Electronically signed by Eddie Linares MD, 12/05/23, [id number]

## 2024-01-08 ENCOUNTER — TELEMEDICINE (OUTPATIENT)
Dept: PSYCHIATRY | Facility: CLINIC | Age: 34
End: 2024-01-08
Payer: OTHER GOVERNMENT

## 2024-01-08 DIAGNOSIS — F33.1 MAJOR DEPRESSIVE DISORDER, RECURRENT EPISODE, MODERATE: Primary | ICD-10-CM

## 2024-01-08 DIAGNOSIS — F41.1 GAD (GENERALIZED ANXIETY DISORDER): ICD-10-CM

## 2024-01-08 PROCEDURE — 90791 PSYCH DIAGNOSTIC EVALUATION: CPT | Performed by: COUNSELOR
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
## 2024-01-19 ENCOUNTER — TELEMEDICINE (OUTPATIENT)
Dept: PSYCHIATRY | Facility: CLINIC | Age: 34
End: 2024-01-19
Payer: OTHER GOVERNMENT

## 2024-01-19 DIAGNOSIS — F41.1 GAD (GENERALIZED ANXIETY DISORDER): ICD-10-CM

## 2024-01-19 DIAGNOSIS — F33.41 MAJOR DEPRESSIVE DISORDER, RECURRENT EPISODE, IN PARTIAL REMISSION: ICD-10-CM

## 2024-01-19 DIAGNOSIS — F90.9 ATTENTION DEFICIT HYPERACTIVITY DISORDER (ADHD), UNSPECIFIED ADHD TYPE: Primary | ICD-10-CM

## 2024-01-19 RX ORDER — DEXTROAMPHETAMINE SACCHARATE, AMPHETAMINE ASPARTATE, DEXTROAMPHETAMINE SULFATE AND AMPHETAMINE SULFATE 2.5; 2.5; 2.5; 2.5 MG/1; MG/1; MG/1; MG/1
10 TABLET ORAL 2 TIMES DAILY
Qty: 60 TABLET | Refills: 0 | Status: SHIPPED | OUTPATIENT
Start: 2024-01-19

## 2024-01-19 RX ORDER — DEXTROAMPHETAMINE SACCHARATE, AMPHETAMINE ASPARTATE, DEXTROAMPHETAMINE SULFATE AND AMPHETAMINE SULFATE 2.5; 2.5; 2.5; 2.5 MG/1; MG/1; MG/1; MG/1
10 TABLET ORAL 2 TIMES DAILY
Qty: 60 TABLET | Refills: 0 | Status: SHIPPED | OUTPATIENT
Start: 2024-02-16

## 2024-01-19 NOTE — PROGRESS NOTES
"[facility]  Follow-up Visit    Chief Complaint: \"Just having a hard time since the move... I lived in [place] for about 9 years, my  is ex-... I had therapy there... when he was out I couldn't go anymore... I had a community of friends and I was pretty involved with the community... I focused on a lot of wellness activities... to manage feeling overwhelmed... I had those resources available... and then we moved... I'm working in a different environment... I'm working third shift... it's been harder to find time to prioritize those things.\"     History of Present Illness: Rosemary Tesfaye is a 33 y.o. female who presents today for follow-up regarding MDD, MARTI, ADHD. Last seen: 12/05 at which time amphetamine was titrated. She presents unaccompanied in no acute distress and engages with me appropriately. Psychotropic regimen perceived to be partially effective. Side-effects per given history: denies.    Current treatment regimen includes:   - bupropion 300 mg QD  - amphetamine 10 mg BID    Today the patient feels well, feels she's had some improvements. She has had some psoriatic plaques, asks Depression symptoms are adequately managed with current interventions. Anxiety symptoms are adequate managed with current interventions. ADHD symptoms are fairly managed with current interventions; she is satisfied with her current regimen as is. Thought process and content are devoid of overt aberration suggestive of acute mona/psychosis. The patient denies SI/HI/AVH. There are no overt changes on exam today compared to most recent evaluation.  - contextual changes: spent some time with friends whom visited her home, socially things are improving lately; hosted D&D group as DM  - sleep: generally adequate, no change  - appetite: generally adequate, no change    I have counseled the patient with regard to diagnoses and the recommended treatment regimen as documented " below. Patient acknowledges the diagnoses per my rendered interpretation. Patient demonstrates understanding of potential risks/benefits/side effects associated with this regimen and is amenable to proceed in this fashion.     Psychotherapy  - Time: 8 minutes  - interventions employed: the therapeutic alliance was strengthened to encourage the patient to express their thoughts and feelings freely. Esteem building was enhanced through praise, reassurance, normalizing/challenging, and encouragement as appropriate. Coping skills were enhanced to build distress tolerance skills and emotional regulation. Allowed patient to freely discuss issues without interruption or judgement with unconditional positive regard, active listening skills, and empathy. Provided a safe, confidential environment to facilitate the development of a positive therapeutic relationship and encourage open, honest communication. Assisted patient in processing session content; acknowledged and normalized/addressed, as appropriate, patient’s thoughts, feelings, and concerns by utilizing a person-centered approach in efforts to build appropriate rapport and a positive therapeutic relationship with open and honest communication.   - Diagnoses: see assessment and plan below  - Symptoms: see subjective above  - Goals   - patient: improve mood, improve concentration/focus, mitigate overwhelm and bolster allostatic threshold, mitigate anxiety   - provider: challenge patterns of living conducive to pathology, strengthen defenses, promote problems solving, restore adaptive functioning and provide symptom relief.  - Treatment plan: continue supportive psychotherapy in subsequent appointments to provide symptom relief; see assessment and plan below for additional details:   - iteration: 1   - progress: partial   - (X)illumination, (X)contextualization, (working)detection, (working)development, (-)elaboration, (-)refinement  - functional status: fair  - mental  "status exam: as below  - prognosis: good    Psychiatric History:  Diagnoses: depression, anxiety  [facility] [employer] [facility]  Inpatient history: denies  Medication trials: \"I've always kind of rejected it... wanted to prioritize other forms of management... 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  Education: bachelor's degree  Pertinent developmental history: struggled some with reading, no formal LD or developmental issues; early exposure to items beyond her level of maturity, no direct abuse hx  Pertinent legal history: denies  Hobbies/interests: farming and raising/milking goats, gardening  Latter day: \"used to be, but no\" (this changed about two years ago); felt her earlene was around 7-8/10 non-[employer]; gradual decline in this belief system for her thereafter  Exercise: not recently, but yes as of a few months ago  Dietary habits: defer  Sleep hygiene: defer  Social habits: hasn't had as much contact with friends or social opportunities since moving to [place]  Sunlight: no concern for under-exposure  Caffeine intake: 4 cups of coffee daily on average, tea in the winter, no soda, no energy drinks  Hydration habits: no pertinent issues   history: denies    Social History     Socioeconomic History    Marital status: Unknown   Tobacco Use    Smoking status: Never    Smokeless tobacco: Never   Vaping Use    Vaping Use: Never used   Substance and Sexual Activity    Alcohol use: Yes     Alcohol/week: 3.0 standard " drinks of alcohol     Types: 3 Cans of beer per week    Drug use: Not Currently     Types: Marijuana    Sexual activity: Yes     Partners: Male     Birth control/protection: I.U.D.     Tobacco use counseling/intervention: N/A, patient does not use tobacco; patient has been counseled with regard to risks of tobacco use.    PHQ-9 Depression Screening  PHQ-9 Total Score:      Little interest or pleasure in doing things?     Feeling down, depressed, or hopeless?     Trouble falling or staying asleep, or sleeping too much?     Feeling tired or having little energy?     Poor appetite or overeating?     Feeling bad about yourself - or that you are a failure or have let yourself or your family down?     Trouble concentrating on things, such as reading the newspaper or watching television?     Moving or speaking so slowly that other people could have noticed? Or the opposite - being so fidgety or restless that you have been moving around a lot more than usual?     Thoughts that you would be better off dead, or of hurting yourself in some way?     PHQ-9 Total Score       Change in PHQ-9 since last measure: N/A (16)    MARTI-7       Change in MARTI-7 since last measure: N/A (13)    Problem List:  Patient Active Problem List   Diagnosis    Major depressive disorder, recurrent episode, in partial remission    MARTI (generalized anxiety disorder)    Attention deficit hyperactivity disorder (ADHD)     Allergy:   No Known Allergies     Discontinued Medications:  There are no discontinued medications.    Current Medications:   Current Outpatient Medications   Medication Sig Dispense Refill    amphetamine-dextroamphetamine (ADDERALL) 10 MG tablet Take 1 tablet by mouth 2 (Two) Times a Day. 60 tablet 0    amphetamine-dextroamphetamine (ADDERALL) 10 MG tablet Take 1 tablet by mouth 2 (Two) Times a Day. 60 tablet 0    buPROPion XL (Wellbutrin XL) 300 MG 24 hr tablet Take 1 tablet by mouth Every Morning. 90 tablet 0    Levonorgestrel (MIRENA,  52 MG, IU) by Intrauterine route.       No current facility-administered medications for this visit.     Past Medical History:  Past Medical History:   Diagnosis Date    ADHD (attention deficit hyperactivity disorder) 10/2017    Was discussed as a diagnosis during session. I opted to avoid medicated treatment.    Anxiety 08/2017    Depression 08/2017    Had episode of Suicidal thoughts and made an appointment.  Attended bi-monthly Sessions with Therapist    Self-injurious behavior 2005    behavior continued for 2 years,     Past Surgical History:  Past Surgical History:   Procedure Laterality Date    COSMETIC SURGERY  04/2016     Mental Status Exam:   Appearance: well-groomed, sits upright, age-appropriate, normal habitus  Behavior: calm, cooperative, appropriate in demeanor, appropriate eye-contact  Mood/affect: euthymic / mood-congruent and appropriate in both range and amplitude  Speech: within expected variance; appropriate rate, appropriate rhythm, appropriate tone; non-pressured  Thought Process: linear, goal-directed; no FOI or CRYSTAL; abstraction intact  Thought Content: coherent, devoid of overt delusions/perceptual disturbances  SI/HI: denies both SI and HI; exhibits future-orientation, self-advocates appropriately, no regular self-harm, no appreciable intent  Memory: no overt deficits  Orientation: oriented to person/place/time/situation  Concentration: appropriate during interview  Intellectual capacity: presumptively average  Insight: fair by given history/exam  Judgment: appropriate by given history/exam  Psychomotor: no appreciable latency/retardation/agitation/tremor  Gait: defer    Vital Signs:   There were no vitals taken for this visit.     Lab Results:   Office Visit on 11/03/2023   Component Date Value Ref Range Status    Amphet/Methamphet, Screen 11/07/2023 Negative  Negative Final    Barbiturates Screen, Urine 11/07/2023 Negative  Negative Final    Benzodiazepine Screen, Urine 11/07/2023 Negative   Negative Final    Cocaine Screen, Urine 11/07/2023 Negative  Negative Final    Opiate Screen 11/07/2023 Negative  Negative Final    THC, Screen, Urine 11/07/2023 Negative  Negative Final    Methadone Screen, Urine 11/07/2023 Negative  Negative Final    Oxycodone Screen, Urine 11/07/2023 Negative  Negative Final    Fentanyl, Urine 11/07/2023 Negative  Negative Final     EKG Results:  No orders to display     Imaging Results:  No Images in the past 120 days found.    ASSESSMENT AND PLAN:    ICD-10-CM ICD-9-CM   1. Attention deficit hyperactivity disorder (ADHD), unspecified ADHD type  F90.9 314.01   2. MARTI (generalized anxiety disorder)  F41.1 300.02   3. Major depressive disorder, recurrent episode, in partial remission  F33.41 296.35     33 y.o. female who presents today for follow-up regarding MDD, MARTI, ADHD. We have discussed the interval history and the treatment plan below, including potential R/B/SE of the recommended regimen of which the patient demonstrates understanding. Patient is agreeable to call 911 or go to the nearest ER should she become concerned for her own safety and/or the safety of those around her. There are no overt indices of acute mona/psychosis on exam today. ISAAC reviewed and as expected.    Medication regimen: no change - continue amphetamine and bupropion; patient is advised not to misuse prescribed medications or to use them with any exogenous substances that aren't disclosed to this provider as they may interact with the regimen to the patient's detriment.   Risk Assessment: protracted risk is low, imminent risk is low - no interval change. Do note that this is subject to change with the Hindu of new stressors, treatment non-adherence, use of substances, and/or new medical ails.   Monitoring: reviewed labs as above  Therapy: refer El[facility]  Follow-up: 3 months  Communications: N/A    TREATMENT PLAN/GOALS: challenge patterns of living conducive to symptom burden, implement recommended  regimen as above with augmentative, intermittent supportive psychotherapy to reduce symptom burden. Patient acknowledged and verbally consented to continue treatment. The importance of adherence to the recommended treatment and interval follow-up appointments was again emphasized today: patient has good treatment adherence per given history. Patient was today reminded to limit daily caffeine intake, hydrate appropriately, eat healthy and nutritious foods, engage sleep hygiene measures, engage appropriate exposure to sunlight, engage with hobbies in balance with life necessities, and exercise appropriate to their capacity to do so.     Billing: This encounter is of moderate complexity based on number/complexity of problems addressed today and risk of complications/morbidity: 2+ stable chronic illnesses and prescription management.     Parts of this note are electronic transcriptions/translations of spoken language to printed text using the Dragon Dictation system.    Electronically signed by Eddie Linares MD, 01/19/24, 1017

## 2024-01-22 ENCOUNTER — TELEPHONE (OUTPATIENT)
Dept: PSYCHIATRY | Facility: CLINIC | Age: 34
End: 2024-01-22
Payer: OTHER GOVERNMENT

## 2024-01-24 NOTE — TELEPHONE ENCOUNTER
3RD CALL      CALLED TO SCHED 3M FOLLOW UP ZABRINA HOUSTON COULDN'T LEAVE MESSAGE MAILBOX FULL

## 2024-02-02 ENCOUNTER — TELEMEDICINE (OUTPATIENT)
Dept: PSYCHIATRY | Facility: CLINIC | Age: 34
End: 2024-02-02
Payer: OTHER GOVERNMENT

## 2024-02-02 DIAGNOSIS — F41.1 GAD (GENERALIZED ANXIETY DISORDER): Primary | ICD-10-CM

## 2024-02-02 DIAGNOSIS — F33.1 MAJOR DEPRESSIVE DISORDER, RECURRENT EPISODE, MODERATE: ICD-10-CM

## 2024-02-02 PROCEDURE — 90834 PSYTX W PT 45 MINUTES: CPT | Performed by: COUNSELOR

## 2024-02-02 NOTE — PROGRESS NOTES
"Date: February 5, 2024  Time In: 9:38  Time out: 10:42      This provider is located at [facility]), [address], [place] [address] using a secure [employer] Video Visit through [employer]. Patient is being seen remotely via telehealth at home address in [place] and stated they are in a secure environment for this session. The patient's condition being diagnosed/treated is appropriate for telemedicine. The provider identified herself as well as her credentials. The patient, and/or patients guardian, consent to be seen remotely, and when consent is given they understand that the consent allows for patient identifiable information to be sent to a third party as needed. They may refuse to be seen remotely at any time. The electronic data is encrypted and password protected, and the patient and/or guardian has been advised of the potential risks to privacy not withstanding such measures.     You have chosen to receive care through a telehealth visit.  Do you consent to use a video/audio connection for your medical care today? Yes    Subjective   Rosemary Tesfaye is a 33 y.o. female who presents today for follow up    Chief Complaint:   Chief Complaint   Patient presents with    Anxiety    Depression        History of Present Illness: Rapport was established through conversation and unconditional positive regard. Recent events were discussed and how these events impact Pt's emotional health.   Pt states they have been using coping skills successfully 3 out of 5 times since last report.  Pt reports continuation of symptoms and states the intensity and duration of symptoms has remained unchanged over the past 2 weeks. Pt rates anxiety at 6/10 and depression at 1. PHQ-9 results were reviewed with Pt.  Pt states it has been a good month except for the weather. Pt states she is from [place] and the \"gloominess\" of [place] has been negatively affecting her " "mood. Discussed coping such as opening blinds, turning on lights and being outside when weather permits.  Pt states they had to put down a favorite goat she raised by hand, and this has made Pt upset. Pt states her sleep is disturbed but she is on night shift and has been for about one year. 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 Pt reports she experiences daily frustration and anxiety at work and she states she is having some difficulty fitting in to the work environment, but Pt admits it may be cultural. Pt reports she often feels lonely and left out of groups. Pt reports \"people do things differently around here.\"  Pt was allowed to process feelings associated with adjustment and Pt's statements and emotional responses were validated.  'Im eating a lot of junk food.\"  Healthy snacking habits discussed. When I'm not on the medicine I can't concentrate.    Clinical Maneuvering/Intervention:   CBT was utilized to encourage Pt to identify maladaptive thoughts and behaviors and replace with more affirming and positive.CBT used to assist Pt with managing anxiety and mood issues through controlled breathing, muscle relaxation, and mindfulness.Motivational interviewing was utilized to encourage Pt to write their own narrative to assist Pt in gaining a better and more reflective understanding of their history.        Assisted patient in processing above session content; acknowledged and normalized patient’s thoughts, feelings, and concerns.  Rationalized patient thought process regarding concerns presented at session.  Discussed triggers associated with patient's  anxiety  and depression  Also discussed coping skills for patient to implement such as grounding , mindfulness , and positive self talk     Allowed patient to freely discuss issues without interruption or judgment. Provided safe, confidential environment to facilitate the development of positive therapeutic relationship and encourage open, honest " communication. Assisted patient in identifying risk factors which would indicate the need for higher level of care including thoughts to harm self or others and/or self-harming behavior and encouraged patient to contact this office, call 911, or present to the nearest emergency room should any of these events occur. Discussed crisis intervention services and means to access. Patient adamantly and convincingly denies current suicidal or homicidal ideation or perceptual disturbance.    Assessment:     Patient appears to maintain relative stability as compared to their baseline.  However, patient continues to struggle with anxiety and depression, which continues to cause impairment in important areas of functioning.  A result, they can be reasonably expected to continue to benefit from treatment and would likely be at increased risk for decompensation otherwise.      PHQ-Score Total:  PHQ-9 Total Score: 14    MARTI-7 Score Total:         Mental Status Exam:   Hygiene:   good  Cooperation:  Cooperative  Eye Contact:  Good  Psychomotor Behavior:  Restless  Affect:  Full range  Mood: anxious  Speech:  Normal  Thought Process:  Goal directed  Thought Content:  Normal and Mood congruent  Suicidal:  None  Homicidal:  None  Hallucinations:  None  Delusion:  None  Memory:  Intact  Orientation:  Grossly intact  Reliability:  good  Insight:  Good  Judgement:  Good  Impulse Control:  Good  Physical/Medical Issues:  No        Patient's Support Network Includes:  significant other    Functional Status: Mild-mod impairment    Progress toward goal: Not at goal    Prognosis: Good with Ongoing Treatment         Plan:    Patient will continue in individual outpatient therapy with focus on improved functioning and coping skills, maintaining stability, and avoiding decompensation and the need for higher level of care.    Patient will adhere to medication regimen as prescribed and report any side effects. Patient will contact this office,  call 911 or present to the nearest emergency room should suicidal or homicidal ideations occur. Provide Cognitive Behavioral Therapy and Solution Focused Therapy to improve functioning, maintain stability, and avoid decompensation and the need for higher level of care.     Return in about 1 week (around 2/9/2024).      VISIT DIAGNOSIS:    Diagnosis Plan   1. MARTI (generalized anxiety disorder)        2. Major depressive disorder, recurrent episode, moderate         09:41 EST       This document has been electronically signed by MARY Blanca  February 5, 2024      Part of this note may be an electronic transcription/translation of spoken language to printed text using the Dragon Dictation System.

## 2024-02-05 NOTE — PATIENT INSTRUCTIONS
Here is a link to the Patient Health Questionnaire - 9 (PHQ-9).    https://www.apa.org/depression-guideline/patient-health-questionnaire.pdf            Here is the link for the MARTI-7 scale    https://adaa.org/sites/default/files/ORA-[email or web address]_Tnfpffh-scqwnii_5.pdf

## 2024-02-20 ENCOUNTER — TELEMEDICINE (OUTPATIENT)
Dept: PSYCHIATRY | Facility: CLINIC | Age: 34
End: 2024-02-20
Payer: OTHER GOVERNMENT

## 2024-02-20 DIAGNOSIS — F33.1 MAJOR DEPRESSIVE DISORDER, RECURRENT EPISODE, MODERATE: ICD-10-CM

## 2024-02-20 DIAGNOSIS — F90.9 ATTENTION DEFICIT HYPERACTIVITY DISORDER (ADHD), UNSPECIFIED ADHD TYPE: ICD-10-CM

## 2024-02-20 DIAGNOSIS — F41.1 GAD (GENERALIZED ANXIETY DISORDER): Primary | ICD-10-CM

## 2024-02-20 PROCEDURE — 90837 PSYTX W PT 60 MINUTES: CPT | Performed by: COUNSELOR

## 2024-02-20 NOTE — PLAN OF CARE
A combination of CBT, Motivational Interviewing, and DBT will be utilized to mitigate Pt's symptoms and retain minimal baseline while working on goals to improve Pt's overall functioning and achieve optimal daily living.

## 2024-02-20 NOTE — PATIENT INSTRUCTIONS
Here is a link to the Patient Health Questionnaire - 9 (PHQ-9).    https://www.apa.org/depression-guideline/patient-health-questionnaire.pdf            Here is the link for the MARTI-7 scale    https://adaa.org/sites/default/files/PMP-4_Nvcenlz-fderksu_[email or web address].pdf

## 2024-02-20 NOTE — PROGRESS NOTES
Date: February 20, 2024  Time In: 10:30  Time out: 11:24      This provider is located at the [facility]through [facility]), [address], [place] [address] using a secure [employer] Video Visit through [employer]. Patient is being seen remotely via telehealth at home address in [place] and stated they are in a secure environment for this session. The patient's condition being diagnosed/treated is appropriate for telemedicine. The provider identified herself as well as her credentials. The patient, and/or patients guardian, consent to be seen remotely, and when consent is given they understand that the consent allows for patient identifiable information to be sent to a third party as needed. They may refuse to be seen remotely at any time. The electronic data is encrypted and password protected, and the patient and/or guardian has been advised of the potential risks to privacy not withstanding such measures.     You have chosen to receive care through a telehealth visit.  Do you consent to use a video/audio connection for your medical care today? Yes    Subjective   Rosemary Tesfaye is a 33 y.o. female who presents today for follow up    Chief Complaint:   Chief Complaint   Patient presents with    Anxiety    Depression    ADHD        History of Present Illness: Rapport was established through conversation and unconditional positive regard. Recent events were discussed and how these events impact Pt's emotional health.   Pt reports successful use coping skills successfully 5 out of 10 times at work and 9/10 times at home, since last report.  Pt reports continuation of symptoms and states the intensity and duration of symptoms has remained unchanged since last report.. Pt rates anxiety at 3/10 and depression at 3/10 when Pt is at home.  Pt rates anxiety 8/10 and depression 8/10 when at work and for a period before and after work.. PHQ-9 and MARTI-7 results were reviewed with  Pt.  Pt states she continues to have significant difficulty with work related stress and she struggles with transition from work to home life after her shift.  Pt admits that working three 12-hour shifts consecutively may attribute to this. Pt also reports sleep difficulty, which Pt states is both a result of the work stress and due to Pt's nighttime work schedule. Pt reports she struggles with acclimating to the culture and social norms of [place] after moving from [facility]. 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  Pt states she has cellulitis in her toes, and this has been ongoing for over a month.  Pt states the rash, swelling, and itching are increasing Pt's anxiety and depression.  Pt reports she and family joined a gym yesterday and Pt is looking forward to improving her mind and body by exercising in a social setting.  Pt states she finds relief from stress with physical touch including hugs from s/o and petting her dog. Pt reports she has a friend a work who is a hugger and Pt will go to he when she needs physical affirmation. Pt reports she has been journaling regularly and this is helpful in managing Pt's anxiety and depression symptoms.    Pt reports she feels her ADHD medication is causing some unwanted side effects and Pt was encouraged to discuss with prescriber. Pt states next visit is two months out. Per Pt request a note sent to referral staff by Counselor via confidential messaging to see if Pt could be seen by Dr. Eddie lara to address Pt's concerns about current medication and to discuss exacerbated symptoms.        Clinical Maneuvering/Intervention:  CBT was utilized to encourage Pt to identify maladaptive thoughts and behaviors and replace with more affirming and positive.CBT used to assist Pt with managing anxiety and mood issues through controlled breathing, muscle relaxation, and  mindfulness.CBT was used to encourage Pt to write down activating events, related thoughts and emotions, and ways to improve outcome should a negative situation present again. Pt will acknowledge their empowerment over their situation, no matter how small it may seem. Pt will identify positive and affirming things in their life each morning and acknowledge throughout the day. Pt encouraged to utilize a motto to bolster positivity and to display the motto in conspicuous areas such as bathroom mirror and refrigerator. Pt will repeat motto to self as needed. Esteem building was enhanced through praise, reassurance, normalizing/challenging, and encouragement as appropriate. Coping skills were enhanced to build distress tolerance skills and emotional regulation. CBT used to encourage Pt to practice grounding techniques        Assisted patient in processing above session content; acknowledged and normalized patient’s thoughts, feelings, and concerns.  Rationalized patient thought process regarding concerns presented at session.  Discussed triggers associated with patient's  anxiety , depression , and ADHD Also discussed coping skills for patient to implement such as mindfulness , increasing activity , self care , and journaling.    Allowed patient to freely discuss issues without interruption or judgment. Provided safe, confidential environment to facilitate the development of positive therapeutic relationship and encourage open, honest communication. Assisted patient in identifying risk factors which would indicate the need for higher level of care including thoughts to harm self or others and/or self-harming behavior and encouraged patient to contact this office, call 911, or present to the nearest emergency room should any of these events occur. Discussed crisis intervention services and means to access. Patient adamantly and convincingly denies current suicidal or homicidal ideation or perceptual  disturbance.    Assessment:     Patient appears to maintain relative stability as compared to their baseline.  However, patient continues to struggle with anxiety and depression, which continues to cause impairment in important areas of functioning.  A result, they can be reasonably expected to continue to benefit from treatment and would likely be at increased risk for decompensation otherwise.      PHQ-Score Total:  PHQ-9 Total Score: 11    MARTI-7 Score Total:  Over the last two weeks, how often have you been bothered by the following problems?  Feeling nervous, anxious or on edge: More than half the days  Not being able to stop or control worrying: More than half the days  Worrying too much about different things: More than half the days  Trouble Relaxing: Several days  Being so restless that it is hard to sit still: Several days  Becoming easily annoyed or irritable: More than half the days  Feeling afraid as if something awful might happen: Not at all  MARTI 7 Total Score: 10  If you checked any problems, how difficult have these problems made it for you to do your work, take care of things at home, or get along with other people: Very difficult      Mental Status Exam:   Hygiene:   good  Cooperation:  Cooperative  Eye Contact:  Good  Psychomotor Behavior:   Tearful, resltessness  Affect:  Restricted  Mood: depressed, anxious, and irritable  Speech:  Normal  Thought Process:  Goal directed  Thought Content:  Normal  Suicidal:  None  Homicidal:  None  Hallucinations:  None  Delusion:  None  Memory:  Intact  Orientation:  Grossly intact  Reliability:  good  Insight:  Good  Judgement:  Good  Impulse Control:  Good  Physical/Medical Issues:  No        Patient's Support Network Includes:      Functional Status: Moderate impairment     Progress toward goal: Not at goal    Prognosis: Good with Ongoing Treatment         Plan:    Patient will continue in individual outpatient therapy with focus on improved  functioning and coping skills, maintaining stability, and avoiding decompensation and the need for higher level of care.    Patient will adhere to medication regimen as prescribed and report any side effects. Patient will contact this office, call 911 or present to the nearest emergency room should suicidal or homicidal ideations occur. Provide Cognitive Behavioral Therapy and Solution Focused Therapy to improve functioning, maintain stability, and avoid decompensation and the need for higher level of care.     Return in about 2 weeks (around 3/5/2024).      VISIT DIAGNOSIS:    Diagnosis Plan   1. MARTI (generalized anxiety disorder)        2. Major depressive disorder, recurrent episode, moderate        3. Attention deficit hyperactivity disorder (ADHD), unspecified ADHD type         10:30 EST       This document has been electronically signed by MARY Blanca  February 20, 2024      Part of this note may be an electronic transcription/translation of spoken language to printed text using the Dragon Dictation System.

## 2024-02-20 NOTE — TREATMENT PLAN
Multi-Disciplinary Problems (from Behavioral Health Treatment Plan)      Active Problems       Problem: Adjustment  Start Date: 02/20/24      Problem Details: The patient self-scales this problem as a 7 with 10 being the worst.          Goal Priority Start Date Expected End Date End Date    Patient will implement healthy coping strategies. -- 02/20/24 -- --    Goal Details: Progress toward goal:  Not appropriate to rate progress toward goal since this is the initial treatment plan.          Goal Intervention Frequency Start Date End Date    Assist patient to identify and develop healthy coping strategies Q2 Weeks 02/20/24 --    Intervention Details: Duration of treatment until until remission of symptoms.                  Problem: Anxiety  Start Date: 02/20/24      Problem Details: The patient self-scales this problem as a 8 with 10 being the worst at work place and 3/10 at home.          Goal Priority Start Date Expected End Date End Date    Patient will develop and implement behavioral and cognitive strategies to reduce anxiety and irrational fears. -- 02/20/24 -- --    Goal Details: Progress toward goal:  Not appropriate to rate progress toward goal since this is the initial treatment plan.          Goal Intervention Frequency Start Date End Date    Help patient explore past emotional issues in relation to present anxiety. Q2 Weeks 02/20/24 --    Intervention Details: Duration of treatment until until remission of symptoms.          Goal Intervention Frequency Start Date End Date    Help patient develop an awareness of their cognitive and physical responses to anxiety. Q2 Weeks 02/20/24 --    Intervention Details: Duration of treatment until until remission of symptoms.                  Problem: Depression  Start Date: 02/20/24      Problem Details: The patient self-scales this problem as a 8 with 10 being the worst at work place and 3/10 at home.          Goal Priority Start Date Expected End Date End Date     Patient will demonstrate the ability to initiate new constructive life skills outside of sessions on a consistent basis. -- 02/20/24 -- --    Goal Details: Progress toward goal:  Not appropriate to rate progress toward goal since this is the initial treatment plan.          Goal Intervention Frequency Start Date End Date    Assist patient in setting attainable activities of daily living goals. Q2 Weeks 02/20/24 --      Goal Intervention Frequency Start Date End Date    Provide education about depression PRN 02/20/24 --    Intervention Details: Duration of treatment until until remission of symptoms.          Goal Intervention Frequency Start Date End Date    Assist patient in developing healthy coping strategies. Q2 Weeks 02/20/24 --    Intervention Details: Duration of treatment until until remission of symptoms.                  Problem: Ineffective Coping  Start Date: 02/20/24      Problem Details: The patient self-scales this problem as a 4 with 10 being the worst.          Goal Priority Start Date Expected End Date End Date    Patient will demonstrate the ability to initiate new constructive life skills consistently. -- 02/20/24 -- --    Goal Details: Progress toward goal:  Not appropriate to rate progress toward goal since this is the initial treatment plan.            Goal Intervention Frequency Start Date End Date    Assist patient in identifying healthy coping behaviors. Weekly 02/20/24 --    Intervention Details: Duration of treatment until until remission of symptoms.                  Problem: Vocational Stress  Start Date: 02/20/24      Problem Details: The patient self-scales this problem as a 8 with 10 being the worst.          Goal Priority Start Date Expected End Date End Date    Patient will develop a constructive action plan that will reduce specific areas of work stress. -- 02/20/24 -- --    Goal Details: Progress toward goal:  Not appropriate to rate progress toward goal since this is the initial  treatment plan.          Goal Intervention Frequency Start Date End Date    Assist patient in identifying emotions and cognitive messages surrounding the work stress. Q2 Weeks 02/20/24 --    Intervention Details: Duration of treatment until until remission of symptoms.          Goal Intervention Frequency Start Date End Date    Reinforce realistic self appraisal of patient's successes and challenges at work. Q2 Weeks 02/20/24 --    Intervention Details: Duration of treatment until until remission of symptoms.                                 I have discussed and reviewed this treatment plan with the patient.

## 2024-03-26 DIAGNOSIS — F90.9 ATTENTION DEFICIT HYPERACTIVITY DISORDER (ADHD), UNSPECIFIED ADHD TYPE: ICD-10-CM

## 2024-03-26 DIAGNOSIS — F33.1 MAJOR DEPRESSIVE DISORDER, RECURRENT EPISODE, MODERATE: ICD-10-CM

## 2024-03-26 RX ORDER — DEXTROAMPHETAMINE SACCHARATE, AMPHETAMINE ASPARTATE, DEXTROAMPHETAMINE SULFATE AND AMPHETAMINE SULFATE 2.5; 2.5; 2.5; 2.5 MG/1; MG/1; MG/1; MG/1
10 TABLET ORAL 2 TIMES DAILY
Qty: 60 TABLET | Refills: 0 | Status: SHIPPED | OUTPATIENT
Start: 2024-03-26

## 2024-03-26 RX ORDER — BUPROPION HYDROCHLORIDE 300 MG/1
300 TABLET ORAL EVERY MORNING
Qty: 90 TABLET | Refills: 0 | Status: SHIPPED | OUTPATIENT
Start: 2024-03-26

## 2024-03-26 NOTE — TELEPHONE ENCOUNTER
NEXT VISIT WITH PROVIDER   Appointment with Eddie Linares MD (04/22/2024)     LAST SEEN BY PROVIDER   Telemedicine with Eddie Linares MD (01/19/2024)     LAST MED REFILL  buPROPion XL (Wellbutrin XL) 300 MG 24 hr tablet (12/05/2023)   Dispense Quantity: 90 tablet Refills: 0          Sig: Take 1 tablet by mouth Every Morning     PROVIDER PLEASE ADVISE

## 2024-04-22 ENCOUNTER — TELEMEDICINE (OUTPATIENT)
Dept: PSYCHIATRY | Facility: CLINIC | Age: 34
End: 2024-04-22
Payer: OTHER GOVERNMENT

## 2024-04-22 DIAGNOSIS — F33.41 MAJOR DEPRESSIVE DISORDER, RECURRENT EPISODE, IN PARTIAL REMISSION: ICD-10-CM

## 2024-04-22 DIAGNOSIS — F90.9 ATTENTION DEFICIT HYPERACTIVITY DISORDER (ADHD), UNSPECIFIED ADHD TYPE: Primary | ICD-10-CM

## 2024-04-22 DIAGNOSIS — F41.1 GAD (GENERALIZED ANXIETY DISORDER): ICD-10-CM

## 2024-04-22 PROCEDURE — 99214 OFFICE O/P EST MOD 30 MIN: CPT | Performed by: PSYCHIATRY & NEUROLOGY

## 2024-04-22 RX ORDER — DEXTROAMPHETAMINE SACCHARATE, AMPHETAMINE ASPARTATE, DEXTROAMPHETAMINE SULFATE AND AMPHETAMINE SULFATE 2.5; 2.5; 2.5; 2.5 MG/1; MG/1; MG/1; MG/1
10 TABLET ORAL 2 TIMES DAILY
Qty: 60 TABLET | Refills: 0 | Status: SHIPPED | OUTPATIENT
Start: 2024-04-26

## 2024-04-22 RX ORDER — DEXTROAMPHETAMINE SACCHARATE, AMPHETAMINE ASPARTATE, DEXTROAMPHETAMINE SULFATE AND AMPHETAMINE SULFATE 2.5; 2.5; 2.5; 2.5 MG/1; MG/1; MG/1; MG/1
10 TABLET ORAL 2 TIMES DAILY
Qty: 60 TABLET | Refills: 0 | Status: SHIPPED | OUTPATIENT
Start: 2024-05-24

## 2024-04-22 NOTE — PROGRESS NOTES
"[facility]  Follow-up Visit    Chief Complaint: \"Just having a hard time since the move... I lived in [place] for about 9 years, my  is ex-... I had therapy there... when he was out I couldn't go anymore... I had a community of friends and I was pretty involved with the community... I focused on a lot of wellness activities... to manage feeling overwhelmed... I had those resources available... and then we moved... I'm working in a different environment... I'm working third shift... it's been harder to find time to prioritize those things.\"     History of Present Illness: Rosemary Tesfaye is a 33 y.o. female who presents today for follow-up regarding MDD, MARTI, ADHD. Last seen: 01/19 at which time no changes were made to her regimen. Services today rendered via telehealth ([facility]) for which the patient provided informed consent. Patient is aware she can refuse to be seen remotely at any time. Patient was located in a secure, remote environment (at home) as was the provider (in-office). I confirmed the patient's identity prior to evaluation and reiterated my credentials; there were no technical issues during the session today. She presents unaccompanied in no acute distress and engages with me appropriately. Psychotropic regimen perceived to be partially effective. Side-effects per given history: denies.    Current treatment regimen includes:   - bupropion 300 mg QD  - amphetamine 10 mg BID    Today she reports she's doing well. Depression symptoms are adequately managed with current interventions. Anxiety symptoms are adequate managed with current interventions. ADHD symptoms are adequately managed with current interventions; she is taking medication holidays as recommended. She and her  may begin to start trying for pregnancy during summer and she wanted to discuss this today; I have discussed with patient that there are risks taking any medication " "during pregnancy and that these risks are poorly elaborated due to ethical concerns with studying medications in this cohort; I've advised risk may be beyond what I'm able to advise today and that medication use should only be considered if potential benefits outweigh the possibility of risks by the patient's measure - discussed worst case scenarios of fetal termination and congenital defects that can lead to lifelong debility. Patient demonstrates capacity to consider risks versus benefits. I have advised amphetamine is generally recommended for discontinuation during pregnancy and breastfeeding and that bupropion is has a B safety rating, indicating it can be used with relative safety in pregnancy. Thought process and content are devoid of overt aberration suggestive of acute mona/psychosis. The patient denies SI/HI/AVH. There are no overt changes on exam today compared to most recent evaluation.  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 Patient acknowledges the diagnoses per my rendered interpretation. Patient demonstrates understanding of potential risks/benefits/side effects associated with this regimen and is amenable to proceed in this fashion.     Psychotherapy  - Time: 10 minutes  - interventions employed: the therapeutic alliance was strengthened to encourage the patient to express their thoughts and feelings freely. Esteem building was enhanced through praise, reassurance, normalizing/challenging, and encouragement as appropriate. Coping skills were enhanced to build distress tolerance skills and emotional regulation. Allowed patient to freely discuss issues without interruption " "or judgement with unconditional positive regard, active listening skills, and empathy. Provided a safe, confidential environment to facilitate the development of a positive therapeutic relationship and encourage open, honest communication. Assisted patient in processing session content; acknowledged and normalized/addressed, as appropriate, patient’s thoughts, feelings, and concerns by utilizing a person-centered approach in efforts to build appropriate rapport and a positive therapeutic relationship with open and honest communication.   - Diagnoses: see assessment and plan below  - Symptoms: see subjective above  - Goals   - patient: improve mood, improve concentration/focus, mitigate overwhelm and bolster allostatic threshold, mitigate anxiety   - provider: challenge patterns of living conducive to pathology, strengthen defenses, promote problems solving, restore adaptive functioning and provide symptom relief.  - Treatment plan: continue supportive psychotherapy in subsequent appointments to provide symptom relief; see assessment and plan below for additional details:   - iteration: 1   - progress: partial   - (X)illumination, (X)contextualization, (working)detection, (working)development, (-)elaboration, (-)refinement  - functional status: fair  - mental status exam: as below  - prognosis: good    Psychiatric History:  Diagnoses: depression, anxiety  [facility] [employer] [facility]  Inpatient history: denies  Medication trials: \"I've always kind of rejected it... wanted to prioritize other forms of management... 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  Education: bachelor's degree  Pertinent developmental history: struggled some with reading, no formal LD or developmental issues; early exposure to items beyond her level of maturity, no direct abuse hx  Pertinent legal history: denies  Hobbies/interests: farming and raising/milking goats, gardening  Hindu: \"used to be, but no\" (this changed about two years ago); felt her earlene was around 7-8/10 non-[employer] Zoroastrianism; gradual decline in this belief system for her thereafter  Exercise: not recently, but yes as of a few months ago  Dietary habits: defer  Sleep hygiene: defer  Social habits: hasn't had as much contact with friends or social opportunities since moving to [place]  Sunlight: no concern for under-exposure  Caffeine intake: 4 cups of coffee daily on average, tea in the winter, no soda, no energy drinks  Hydration habits: no pertinent issues   history: denies    Social History     Socioeconomic History    Marital status: Unknown   Tobacco Use    Smoking status: Never    Smokeless tobacco: Never   Vaping Use    Vaping status: Never Used   Substance and Sexual Activity    Alcohol use: Yes     Alcohol/week: 3.0 standard drinks of alcohol     Types: 3 Cans of beer per week    Drug use: Not Currently     Types: Marijuana    Sexual activity: Yes     Partners: Male     Birth control/protection: I.U.D.     Tobacco use counseling/intervention: N/A, patient does not use tobacco; patient has been counseled with regard to risks of tobacco use.    PHQ-9 Depression Screening  PHQ-9 Total Score:      Little interest or pleasure in doing things?     Feeling down, depressed, or hopeless?     Trouble falling or staying asleep, or sleeping too much?     Feeling tired or having little energy?     Poor appetite or overeating?     Feeling bad about yourself - or that you are a failure or " have let yourself or your family down?     Trouble concentrating on things, such as reading the newspaper or watching television?     Moving or speaking so slowly that other people could have noticed? Or the opposite - being so fidgety or restless that you have been moving around a lot more than usual?     Thoughts that you would be better off dead, or of hurting yourself in some way?     PHQ-9 Total Score       Change in PHQ-9 since last measure: N/A (16)    MARTI-7       Change in MARTI-7 since last measure: N/A (13)    Problem List:  Patient Active Problem List   Diagnosis    Major depressive disorder, recurrent episode, in partial remission    MARTI (generalized anxiety disorder)    Attention deficit hyperactivity disorder (ADHD)     Allergy:   No Known Allergies     Discontinued Medications:  There are no discontinued medications.    Current Medications:   Current Outpatient Medications   Medication Sig Dispense Refill    amphetamine-dextroamphetamine (ADDERALL) 10 MG tablet Take 1 tablet by mouth 2 (Two) Times a Day. 60 tablet 0    amphetamine-dextroamphetamine (ADDERALL) 10 MG tablet Take 1 tablet by mouth 2 (Two) Times a Day. 60 tablet 0    buPROPion XL (WELLBUTRIN XL) 300 MG 24 hr tablet TAKE ONE TABLET BY MOUTH EVERY MORNING 90 tablet 0    Levonorgestrel (MIRENA, 52 MG, IU) by Intrauterine route.       No current facility-administered medications for this visit.     Past Medical History:  Past Medical History:   Diagnosis Date    ADHD (attention deficit hyperactivity disorder) 10/2017    Was discussed as a diagnosis during session. I opted to avoid medicated treatment.    Anxiety 08/2017    Depression 08/2017    Had episode of Suicidal thoughts and made an appointment.  Attended bi-monthly Sessions with Therapist    Self-injurious behavior 2005    behavior continued for 2 years,     Past Surgical History:  Past Surgical History:   Procedure Laterality Date    COSMETIC SURGERY  04/2016     Mental Status Exam:    Appearance: well-groomed, sits upright, age-appropriate, normal habitus  Behavior: calm, cooperative, appropriate in demeanor, appropriate eye-contact  Mood/affect: euthymic / mood-congruent and appropriate in both range and amplitude  Speech: within expected variance; appropriate rate, appropriate rhythm, appropriate tone; non-pressured  Thought Process: linear, goal-directed; no FOI or CRYSTAL; abstraction intact  Thought Content: coherent, devoid of overt delusions/perceptual disturbances  SI/HI: denies both SI and HI; exhibits future-orientation, self-advocates appropriately, no regular self-harm, no appreciable intent  Memory: no overt deficits  Orientation: oriented to person/place/time/situation  Concentration: appropriate during interview  Intellectual capacity: presumptively average  Insight: fair by given history/exam  Judgment: appropriate by given history/exam  Psychomotor: no appreciable latency/retardation/agitation/tremor  Gait: defer    Vital Signs:   There were no vitals taken for this visit.     Lab Results:   Office Visit on 11/03/2023   Component Date Value Ref Range Status    Amphet/Methamphet, Screen 11/07/2023 Negative  Negative Final    Barbiturates Screen, Urine 11/07/2023 Negative  Negative Final    Benzodiazepine Screen, Urine 11/07/2023 Negative  Negative Final    Cocaine Screen, Urine 11/07/2023 Negative  Negative Final    Opiate Screen 11/07/2023 Negative  Negative Final    THC, Screen, Urine 11/07/2023 Negative  Negative Final    Methadone Screen, Urine 11/07/2023 Negative  Negative Final    Oxycodone Screen, Urine 11/07/2023 Negative  Negative Final    Fentanyl, Urine 11/07/2023 Negative  Negative Final     EKG Results:  No orders to display     Imaging Results:  No Images in the past 120 days found.    ASSESSMENT AND PLAN:    ICD-10-CM ICD-9-CM   1. Attention deficit hyperactivity disorder (ADHD), unspecified ADHD type  F90.9 314.01   2. Major depressive disorder, recurrent episode, in  partial remission  F33.41 296.35   3. MARTI (generalized anxiety disorder)  F41.1 300.02     33 y.o. female who presents today for follow-up regarding MDD, MARTI, ADHD. We have discussed the interval history and the treatment plan below, including potential R/B/SE of the recommended regimen of which the patient demonstrates understanding. Patient is agreeable to call 911 or go to the nearest ER should she become concerned for her own safety and/or the safety of those around her. There are no overt indices of acute mona/psychosis on exam today. ISAAC reviewed and as expected.    Medication regimen: no change - continue amphetamine and bupropion; patient is advised not to misuse prescribed medications or to use them with any exogenous substances that aren't disclosed to this provider as they may interact with the regimen to the patient's detriment.   Risk Assessment: protracted risk is low, imminent risk is low - no interval change. Do note that this is subject to change with the Shinto of new stressors, treatment non-adherence, use of substances, and/or new medical ails.   Monitoring: reviewed labs as above  Therapy: refer [facility]  Follow-up: 2 months  Communications: N/A    TREATMENT PLAN/GOALS: challenge patterns of living conducive to symptom burden, implement recommended regimen as above with augmentative, intermittent supportive psychotherapy to reduce symptom burden. Patient acknowledged and verbally consented to continue treatment. The importance of adherence to the recommended treatment and interval follow-up appointments was again emphasized today: patient has good treatment adherence per given history. Patient was today reminded to limit daily caffeine intake, hydrate appropriately, eat healthy and nutritious foods, engage sleep hygiene measures, engage appropriate exposure to sunlight, engage with hobbies in balance with life necessities, and exercise appropriate to their capacity to do so.     Billing: This  encounter is of moderate complexity based on number/complexity of problems addressed today and risk of complications/morbidity: 2+ stable chronic illnesses and prescription management.     Parts of this note are electronic transcriptions/translations of spoken language to printed text using the Dragon Dictation system.    Electronically signed by Eddie Linares MD, 04/22/24, 0788

## 2024-05-09 ENCOUNTER — TELEPHONE (OUTPATIENT)
Dept: PSYCHIATRY | Facility: CLINIC | Age: 34
End: 2024-05-09
Payer: OTHER GOVERNMENT

## 2024-06-10 ENCOUNTER — OFFICE VISIT (OUTPATIENT)
Dept: OBSTETRICS AND GYNECOLOGY | Facility: CLINIC | Age: 34
End: 2024-06-10
Payer: OTHER GOVERNMENT

## 2024-06-10 VITALS
HEIGHT: 66 IN | DIASTOLIC BLOOD PRESSURE: 80 MMHG | HEART RATE: 80 BPM | SYSTOLIC BLOOD PRESSURE: 121 MMHG | WEIGHT: 146 LBS | BODY MASS INDEX: 23.46 KG/M2

## 2024-06-10 DIAGNOSIS — Z32.00 VISIT FOR CONFIRMATION OF PREGNANCY TEST RESULT WITH PHYSICAL EXAM: Primary | ICD-10-CM

## 2024-06-10 DIAGNOSIS — F33.41 MAJOR DEPRESSIVE DISORDER, RECURRENT EPISODE, IN PARTIAL REMISSION: ICD-10-CM

## 2024-06-10 DIAGNOSIS — F41.1 GAD (GENERALIZED ANXIETY DISORDER): ICD-10-CM

## 2024-06-10 DIAGNOSIS — F90.9 ATTENTION DEFICIT HYPERACTIVITY DISORDER (ADHD), UNSPECIFIED ADHD TYPE: ICD-10-CM

## 2024-06-10 PROCEDURE — 99203 OFFICE O/P NEW LOW 30 MIN: CPT | Performed by: OBSTETRICS & GYNECOLOGY

## 2024-06-10 NOTE — ASSESSMENT & PLAN NOTE
I have reviewed today's ultrasound findings with the patient.  The patient's LMP EDC was confirmed.  No gross abnormalities are noted on today's study.  Patient will continue her daily prenatal vitamin.  She will follow-up to initiate OB care.

## 2024-06-10 NOTE — ASSESSMENT & PLAN NOTE
Risks of medication such as bupropion were discussed in pregnancy.  The patient is stable and doing well with the bupropion XL.  She will continue bupropion  mg daily

## 2024-06-10 NOTE — PROGRESS NOTES
"[facility]  Gynecological Visit    CC: Follow-up ultrasound    Subjective:   33 y.o. who presents in follow-up of a pelvic ultrasound for dating and viability.  Patient denies any vaginal bleeding or pelvic pain.  She reports her nausea is doing much better.  She states that she is struggling due to her ADHD.  She has been off her Adderall since finding out she was pregnant.  She is still taking her bupropion.  She complains of having difficulty concentrating and fatigue.  She feels like she would likely need to go back on her Adderall.    History:   Past medical history, medications, allergies, surgical history, social history, and obstetrical history all reviewed and updated.    Last Completed Pap Smear       This patient has no relevant Health Maintenance data.          Objective:/80   Pulse 80   Ht 167.6 cm (66\")   Wt 66.2 kg (146 lb)   LMP 2024   BMI 23.57 kg/m²     Physical Exam  Vitals and nursing note reviewed.   Constitutional:       General: She is not in acute distress.     Appearance: Normal appearance. She is not ill-appearing.   HENT:      Head: Normocephalic and atraumatic.   Neck:      Thyroid: No thyroid mass or thyromegaly.   Abdominal:      General: Abdomen is flat. There is no distension.      Palpations: Abdomen is soft. There is no mass.      Tenderness: There is no abdominal tenderness. There is no guarding or rebound.   Musculoskeletal:         General: No swelling.      Right lower leg: No edema.      Left lower leg: No edema.   Skin:     General: Skin is warm and dry.      Findings: No rash.   Neurological:      Mental Status: She is alert and oriented to person, place, and time.   Psychiatric:         Mood and Affect: Mood normal.         Behavior: Behavior normal.         Thought Content: Thought content normal.       Assessment and Plan:  Diagnoses and all orders for this visit:    1. Visit for confirmation of pregnancy test result with physical " exam (Primary)  Assessment & Plan:  I have reviewed today's ultrasound findings with the patient.  The patient's LMP EDC was confirmed.  No gross abnormalities are noted on today's study.  Patient will continue her daily prenatal vitamin.  She will follow-up to initiate OB care.      2. Attention deficit hyperactivity disorder (ADHD), unspecified ADHD type  Assessment & Plan:  We discussed that Adderall is a stimulant.  We discussed that stimulant use during pregnancy can be associated with low birthweight infants as well as possible  labor.  We did discuss that overall would not expect any congenital birth defects associated with medication such as Adderall.  We did discuss that if her and her psychiatrist felt that it was necessary to continue the Adderall that we certainly can monitor the fetal growth with ultrasound but may not be able to prevent any associated birth weight changes by discontinue medication later in pregnancy.  Ultimately I discussed with the patient that if the medication is needed that I do not think it should be withheld in the situation.      3. Major depressive disorder, recurrent episode, in partial remission  Assessment & Plan:  Risks of medication such as bupropion were discussed in pregnancy.  The patient is stable and doing well with the bupropion XL.  She will continue bupropion  mg daily      4. MARTI (generalized anxiety disorder)        Follow Up:  Return in about 4 weeks (around 2024) for initial ob visit.    Satya Delarosa MD  06/10/2024

## 2024-06-10 NOTE — ASSESSMENT & PLAN NOTE
We discussed that Adderall is a stimulant.  We discussed that stimulant use during pregnancy can be associated with low birthweight infants as well as possible  labor.  We did discuss that overall would not expect any congenital birth defects associated with medication such as Adderall.  We did discuss that if her and her psychiatrist felt that it was necessary to continue the Adderall that we certainly can monitor the fetal growth with ultrasound but may not be able to prevent any associated birth weight changes by discontinue medication later in pregnancy.  Ultimately I discussed with the patient that if the medication is needed that I do not think it should be withheld in the situation.

## 2024-06-24 ENCOUNTER — OFFICE VISIT (OUTPATIENT)
Dept: PSYCHIATRY | Facility: CLINIC | Age: 34
End: 2024-06-24
Payer: OTHER GOVERNMENT

## 2024-06-24 VITALS
HEIGHT: 66 IN | BODY MASS INDEX: 24.53 KG/M2 | HEART RATE: 77 BPM | SYSTOLIC BLOOD PRESSURE: 110 MMHG | DIASTOLIC BLOOD PRESSURE: 69 MMHG | WEIGHT: 152.6 LBS

## 2024-06-24 DIAGNOSIS — F41.1 GAD (GENERALIZED ANXIETY DISORDER): ICD-10-CM

## 2024-06-24 DIAGNOSIS — F90.9 ATTENTION DEFICIT HYPERACTIVITY DISORDER (ADHD), UNSPECIFIED ADHD TYPE: Primary | ICD-10-CM

## 2024-06-24 DIAGNOSIS — F33.41 MAJOR DEPRESSIVE DISORDER, RECURRENT, IN PARTIAL REMISSION: ICD-10-CM

## 2024-06-24 PROCEDURE — 99214 OFFICE O/P EST MOD 30 MIN: CPT | Performed by: PSYCHIATRY & NEUROLOGY

## 2024-06-24 RX ORDER — DEXTROAMPHETAMINE SACCHARATE, AMPHETAMINE ASPARTATE, DEXTROAMPHETAMINE SULFATE AND AMPHETAMINE SULFATE 2.5; 2.5; 2.5; 2.5 MG/1; MG/1; MG/1; MG/1
10 TABLET ORAL 2 TIMES DAILY
Qty: 60 TABLET | Refills: 0 | Status: SHIPPED | OUTPATIENT
Start: 2024-06-24

## 2024-06-24 RX ORDER — BUPROPION HYDROCHLORIDE 300 MG/1
300 TABLET ORAL EVERY MORNING
Qty: 90 TABLET | Refills: 0 | Status: SHIPPED | OUTPATIENT
Start: 2024-06-24

## 2024-06-24 RX ORDER — DEXTROAMPHETAMINE SACCHARATE, AMPHETAMINE ASPARTATE, DEXTROAMPHETAMINE SULFATE AND AMPHETAMINE SULFATE 2.5; 2.5; 2.5; 2.5 MG/1; MG/1; MG/1; MG/1
10 TABLET ORAL 2 TIMES DAILY
Qty: 60 TABLET | Refills: 0 | Status: SHIPPED | OUTPATIENT
Start: 2024-07-23

## 2024-06-24 NOTE — PROGRESS NOTES
"[facility]  Follow-up Visit    Chief Complaint: \"Just having a hard time since the move... I lived in [place] for about 9 years, my  is ex-... I had therapy there... when he was out I couldn't go anymore... I had a community of friends and I was pretty involved with the community... I focused on a lot of wellness activities... to manage feeling overwhelmed... I had those resources available... and then we moved... I'm working in a different environment... I'm working third shift... it's been harder to find time to prioritize those things.\"     History of Present Illness: Rosemary Tesfaye is a 33 y.o. female who presents today for follow-up regarding MDD, MARTI, ADHD. Last seen: 04/22 at which time no changes were made to her regimen. She presents unaccompanied in no acute distress and engages with me appropriately. Psychotropic regimen perceived to be partially effective. Side-effects per given history: denies.    Current treatment regimen includes:   - bupropion 300 mg QD  - amphetamine 10 mg BID (hasn't been taking this 2/2 pregnancy)    Today she is doing okay; she has discovered she is pregnant and has stopped taking amphetamine. She does notice that self-care, exercise, and journaling have been more minimal lately; she worries if this continues that she'll be feeling worse and won't be able to stay on top of things while preparing to give birth. She has spoken to her OBGYN about taking amphetamine during pregnancy and this is something she'd like to do. Depression symptoms are adequately managed with current interventions. Anxiety symptoms are adequate managed with current interventions. ADHD symptoms are adequately managed with current interventions; she is taking medication holidays as recommended. Thought process and content are devoid of overt aberration suggestive of acute mona/psychosis. The patient denies SI/HI/AVH. There are no overt changes on " "exam today compared to most recent evaluation.  - contextual changes: she has discovered she is pregnant  - sleep: \"about the same\", no change  - appetite: increased    I have counseled the patient with regard to diagnoses and the recommended treatment regimen as documented below. Patient acknowledges the diagnoses per my rendered interpretation. Patient demonstrates understanding of potential risks/benefits/side effects associated with this regimen and is amenable to proceed in this fashion.     Psychotherapy  - Time: 10 minutes  - interventions employed: the therapeutic alliance was strengthened to encourage the patient to express their thoughts and feelings freely. Esteem building was enhanced through praise, reassurance, normalizing/challenging, and encouragement as appropriate. Coping skills were enhanced to build distress tolerance skills and emotional regulation. Allowed patient to freely discuss issues without interruption or judgement with unconditional positive regard, active listening skills, and empathy. Provided a safe, confidential environment to facilitate the development of a positive therapeutic relationship and encourage open, honest communication. Assisted patient in processing session content; acknowledged and normalized/addressed, as appropriate, patient’s thoughts, feelings, and concerns by utilizing a person-centered approach in efforts to build appropriate rapport and a positive therapeutic relationship with open and honest communication.   - Diagnoses: see assessment and plan below  - Symptoms: see subjective above  - Goals   - patient: improve mood, improve concentration/focus, mitigate overwhelm and bolster allostatic threshold, mitigate anxiety   - provider: challenge patterns of living conducive to pathology, strengthen defenses, promote problems solving, restore adaptive functioning and provide symptom relief.  - Treatment plan: continue supportive psychotherapy in subsequent " "appointments to provide symptom relief; see assessment and plan below for additional details:   - iteration: 1   - progress: partial   - (X)illumination, (X)contextualization, (working)detection, (working)development, (-)elaboration, (-)refinement  - functional status: fair  - mental status exam: as below  - prognosis: good    Psychiatric History:  Diagnoses: depression, anxiety  [facility] [employer] [facility]  Inpatient history: denies  Medication trials: \"I've always kind of rejected it... wanted to prioritize other forms of management... 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  Education: bachelor's degree  Pertinent developmental history: struggled some with reading, no formal LD or developmental issues; early exposure to items beyond her level of maturity, no direct abuse hx  Pertinent legal history: denies  Hobbies/interests: farming and raising/milking goats, gardening  Episcopal: \"used to be, but no\" (this changed about two years ago); felt her earlene was around 7-8/10 non-Religion [employer]; gradual decline in this belief system for her thereafter  Exercise: not recently, but yes as of a few months ago  Dietary habits: defer  Sleep hygiene: defer  Social habits: hasn't had as much contact with friends or social opportunities since moving to [place]  Sunlight: no concern for under-exposure  Caffeine intake: 4 cups of coffee daily on average, tea in the winter, no soda, no energy drinks  Hydration habits: no pertinent " issues   history: denies    Social History     Socioeconomic History    Marital status: Unknown   Tobacco Use    Smoking status: Never    Smokeless tobacco: Never   Vaping Use    Vaping status: Never Used   Substance and Sexual Activity    Alcohol use: Not Currently     Alcohol/week: 3.0 standard drinks of alcohol     Types: 3 Cans of beer per week    Drug use: Not Currently     Types: Marijuana    Sexual activity: Yes     Partners: Male     Tobacco use counseling/intervention: N/A, patient does not use tobacco; patient has been counseled with regard to risks of tobacco use.    PHQ-9 Depression Screening  PHQ-9 Total Score: 9    Little interest or pleasure in doing things? 2-->more than half the days   Feeling down, depressed, or hopeless? 0-->not at all   Trouble falling or staying asleep, or sleeping too much? 2-->more than half the days   Feeling tired or having little energy? 1-->several days   Poor appetite or overeating? 0-->not at all   Feeling bad about yourself - or that you are a failure or have let yourself or your family down? 1-->several days   Trouble concentrating on things, such as reading the newspaper or watching television? 2-->more than half the days   Moving or speaking so slowly that other people could have noticed? Or the opposite - being so fidgety or restless that you have been moving around a lot more than usual? 1-->several days   Thoughts that you would be better off dead, or of hurting yourself in some way? 0-->not at all   PHQ-9 Total Score 9     Change in PHQ-9 since last measure: -7 (16)    MARTI-7  Feeling nervous, anxious or on edge: Several days  Not being able to stop or control worrying: Several days  Worrying too much about different things: More than half the days  Trouble Relaxing: Several days  Being so restless that it is hard to sit still: Not at all  Feeling afraid as if something awful might happen: Several days  Becoming easily annoyed or irritable: Not at all  MARTI  7 Total Score: 6  If you checked any problems, how difficult have these problems made it for you to do your work, take care of things at home, or get along with other people: Somewhat difficult    Change in MARTI-7 since last measure: -7 (13)    Problem List:  Patient Active Problem List   Diagnosis    Major depressive disorder, recurrent episode, in partial remission    MARTI (generalized anxiety disorder)    Attention deficit hyperactivity disorder (ADHD)    Visit for confirmation of pregnancy test result with physical exam     Allergy:   No Known Allergies     Discontinued Medications:  There are no discontinued medications.    Current Medications:   Current Outpatient Medications   Medication Sig Dispense Refill    buPROPion XL (WELLBUTRIN XL) 300 MG 24 hr tablet TAKE ONE TABLET BY MOUTH EVERY MORNING 90 tablet 0    amphetamine-dextroamphetamine (ADDERALL) 10 MG tablet Take 1 tablet by mouth 2 (Two) Times a Day. (Patient not taking: Reported on 6/24/2024) 60 tablet 0    amphetamine-dextroamphetamine (ADDERALL) 10 MG tablet Take 1 tablet by mouth 2 (Two) Times a Day. (Patient not taking: Reported on 6/24/2024) 60 tablet 0     No current facility-administered medications for this visit.     Past Medical History:  Past Medical History:   Diagnosis Date    ADHD (attention deficit hyperactivity disorder) 10/2017    Was discussed as a diagnosis during session. I opted to avoid medicated treatment.    Anxiety 08/2017    Depression 08/2017    Had episode of Suicidal thoughts and made an appointment.  Attended bi-monthly Sessions with Therapist    Self-injurious behavior 2005    behavior continued for 2 years,     Past Surgical History:  Past Surgical History:   Procedure Laterality Date    COSMETIC SURGERY  04/2016     Mental Status Exam:   Appearance: well-groomed, sits upright, age-appropriate, normal habitus  Behavior: calm, cooperative, appropriate in demeanor, appropriate eye-contact  Mood/affect: euthymic /  "mood-congruent and appropriate in both range and amplitude  Speech: within expected variance; appropriate rate, appropriate rhythm, appropriate tone; non-pressured  Thought Process: linear, goal-directed; no FOI or CRYSTAL; abstraction intact  Thought Content: coherent, devoid of overt delusions/perceptual disturbances  SI/HI: denies both SI and HI; exhibits future-orientation, self-advocates appropriately, no regular self-harm, no appreciable intent  Memory: no overt deficits  Orientation: oriented to person/place/time/situation  Concentration: appropriate during interview, +subjective deficits  Intellectual capacity: presumptively average  Insight: fair by given history/exam  Judgment: appropriate by given history/exam  Psychomotor: no appreciable latency/retardation/agitation/tremor  Gait: defer    Review of Systems   Constitutional:  Positive for activity change, appetite change and unexpected weight change.   Cardiovascular:  Negative for chest pain and palpitations.   Gastrointestinal:  Positive for abdominal pain and nausea.   Psychiatric/Behavioral:  Negative for agitation and sleep disturbance.      Vital Signs:   /69   Pulse 77   Ht 167.6 cm (66\")   Wt 69.2 kg (152 lb 9.6 oz)   BMI 24.63 kg/m²      Lab Results:   No visits with results within 6 Month(s) from this visit.   Latest known visit with results is:   Office Visit on 11/03/2023   Component Date Value Ref Range Status    Amphet/Methamphet, Screen 11/07/2023 Negative  Negative Final    Barbiturates Screen, Urine 11/07/2023 Negative  Negative Final    Benzodiazepine Screen, Urine 11/07/2023 Negative  Negative Final    Cocaine Screen, Urine 11/07/2023 Negative  Negative Final    Opiate Screen 11/07/2023 Negative  Negative Final    THC, Screen, Urine 11/07/2023 Negative  Negative Final    Methadone Screen, Urine 11/07/2023 Negative  Negative Final    Oxycodone Screen, Urine 11/07/2023 Negative  Negative Final    Fentanyl, Urine 11/07/2023 Negative "  Negative Final     EKG Results:  No orders to display     Imaging Results:  No Images in the past 120 days found.    ASSESSMENT AND PLAN:    ICD-10-CM ICD-9-CM   1. Attention deficit hyperactivity disorder (ADHD), unspecified ADHD type  F90.9 314.01   2. MARTI (generalized anxiety disorder)  F41.1 300.02   3. Major depressive disorder, recurrent episode, in partial remission  F33.41 296.35     33 y.o. female who presents today for follow-up regarding MDD, MARTI, ADHD. We have discussed the interval history and the treatment plan below, including potential R/B/SE of the recommended regimen of which the patient demonstrates understanding. Patient is agreeable to call 911 or go to the nearest ER should she become concerned for her own safety and/or the safety of those around her. There are no overt indices of acute mona/psychosis on exam today. ISAAC reviewed and as expected.    Medication regimen: no change - continue amphetamine and bupropion; patient is advised not to misuse prescribed medications or to use them with any exogenous substances that aren't disclosed to this provider as they may interact with the regimen to the patient's detriment.   Risk Assessment: protracted risk is low, imminent risk is low - no interval change. Do note that this is subject to change with the Nondenominational of new stressors, treatment non-adherence, use of substances, and/or new medical ails.   Monitoring: reviewed labs as above  Therapy: referred  Follow-up: 3 months  Communications: N/A    TREATMENT PLAN/GOALS: challenge patterns of living conducive to symptom burden, implement recommended regimen as above with augmentative, intermittent supportive psychotherapy to reduce symptom burden. Patient acknowledged and verbally consented to continue treatment. The importance of adherence to the recommended treatment and interval follow-up appointments was again emphasized today: patient has good treatment adherence per given history. Patient was  today reminded to limit daily caffeine intake, hydrate appropriately, eat healthy and nutritious foods, engage sleep hygiene measures, engage appropriate exposure to sunlight, engage with hobbies in balance with life necessities, and exercise appropriate to their capacity to do so.     Billing: This encounter is of moderate complexity based on number/complexity of problems addressed today and risk of complications/morbidity: 2+ stable chronic illnesses and prescription management.     Parts of this note are electronic transcriptions/translations of spoken language to printed text using the Dragon Dictation system.    Electronically signed by Eddie Linares MD, 06/24/24, [id number]

## 2024-07-15 ENCOUNTER — INITIAL PRENATAL (OUTPATIENT)
Dept: OBSTETRICS AND GYNECOLOGY | Facility: CLINIC | Age: 34
End: 2024-07-15
Payer: OTHER GOVERNMENT

## 2024-07-15 VITALS — DIASTOLIC BLOOD PRESSURE: 88 MMHG | SYSTOLIC BLOOD PRESSURE: 128 MMHG | BODY MASS INDEX: 25.41 KG/M2 | WEIGHT: 157.4 LBS

## 2024-07-15 DIAGNOSIS — F90.9 ATTENTION DEFICIT HYPERACTIVITY DISORDER (ADHD), UNSPECIFIED ADHD TYPE: ICD-10-CM

## 2024-07-15 DIAGNOSIS — O99.340 ANXIETY DISORDER AFFECTING PREGNANCY, ANTEPARTUM: ICD-10-CM

## 2024-07-15 DIAGNOSIS — F41.9 ANXIETY DISORDER AFFECTING PREGNANCY, ANTEPARTUM: ICD-10-CM

## 2024-07-15 DIAGNOSIS — Z34.80 SUPERVISION OF OTHER NORMAL PREGNANCY, ANTEPARTUM: Primary | ICD-10-CM

## 2024-07-15 PROBLEM — Z32.00 VISIT FOR CONFIRMATION OF PREGNANCY TEST RESULT WITH PHYSICAL EXAM: Status: RESOLVED | Noted: 2024-06-10 | Resolved: 2024-07-15

## 2024-07-15 LAB
ABO GROUP BLD: NORMAL
AMPHET+METHAMPHET UR QL: NEGATIVE
BARBITURATES UR QL SCN: NEGATIVE
BASOPHILS # BLD AUTO: 0.03 10*3/MM3 (ref 0–0.2)
BASOPHILS NFR BLD AUTO: 0.3 % (ref 0–1.5)
BENZODIAZ UR QL SCN: NEGATIVE
BLD GP AB SCN SERPL QL: NEGATIVE
CANNABINOIDS SERPL QL: NEGATIVE
COCAINE UR QL: NEGATIVE
DEPRECATED RDW RBC AUTO: 45.5 FL (ref 37–54)
EOSINOPHIL # BLD AUTO: 0.21 10*3/MM3 (ref 0–0.4)
EOSINOPHIL NFR BLD AUTO: 2.1 % (ref 0.3–6.2)
ERYTHROCYTE [DISTWIDTH] IN BLOOD BY AUTOMATED COUNT: 13.6 % (ref 12.3–15.4)
FENTANYL UR-MCNC: NEGATIVE NG/ML
HBV SURFACE AG SERPL QL IA: NORMAL
HCT VFR BLD AUTO: 41.5 % (ref 34–46.6)
HCV AB SER QL: NORMAL
HGB BLD-MCNC: 13.5 G/DL (ref 12–15.9)
HIV 1+2 AB+HIV1 P24 AG SERPL QL IA: NORMAL
IMM GRANULOCYTES # BLD AUTO: 0.08 10*3/MM3 (ref 0–0.05)
IMM GRANULOCYTES NFR BLD AUTO: 0.8 % (ref 0–0.5)
LYMPHOCYTES # BLD AUTO: 1.62 10*3/MM3 (ref 0.7–3.1)
LYMPHOCYTES NFR BLD AUTO: 16.3 % (ref 19.6–45.3)
MCH RBC QN AUTO: 30.3 PG (ref 26.6–33)
MCHC RBC AUTO-ENTMCNC: 32.5 G/DL (ref 31.5–35.7)
MCV RBC AUTO: 93.3 FL (ref 79–97)
METHADONE UR QL SCN: NEGATIVE
MONOCYTES # BLD AUTO: 0.46 10*3/MM3 (ref 0.1–0.9)
MONOCYTES NFR BLD AUTO: 4.6 % (ref 5–12)
NEUTROPHILS NFR BLD AUTO: 7.53 10*3/MM3 (ref 1.7–7)
NEUTROPHILS NFR BLD AUTO: 75.9 % (ref 42.7–76)
NRBC BLD AUTO-RTO: 0 /100 WBC (ref 0–0.2)
OPIATES UR QL: NEGATIVE
OXYCODONE UR QL SCN: NEGATIVE
PLATELET # BLD AUTO: 189 10*3/MM3 (ref 140–450)
PMV BLD AUTO: 9.8 FL (ref 6–12)
RBC # BLD AUTO: 4.45 10*6/MM3 (ref 3.77–5.28)
RH BLD: POSITIVE
TREPONEMA PALLIDUM IGG+IGM AB [PRESENCE] IN SERUM OR PLASMA BY IMMUNOASSAY: NORMAL
WBC NRBC COR # BLD AUTO: 9.93 10*3/MM3 (ref 3.4–10.8)

## 2024-07-15 PROCEDURE — 85025 COMPLETE CBC W/AUTO DIFF WBC: CPT | Performed by: OBSTETRICS & GYNECOLOGY

## 2024-07-15 PROCEDURE — 86901 BLOOD TYPING SEROLOGIC RH(D): CPT | Performed by: OBSTETRICS & GYNECOLOGY

## 2024-07-15 PROCEDURE — 80307 DRUG TEST PRSMV CHEM ANLYZR: CPT | Performed by: OBSTETRICS & GYNECOLOGY

## 2024-07-15 PROCEDURE — G0123 SCREEN CERV/VAG THIN LAYER: HCPCS | Performed by: OBSTETRICS & GYNECOLOGY

## 2024-07-15 PROCEDURE — 86900 BLOOD TYPING SEROLOGIC ABO: CPT | Performed by: OBSTETRICS & GYNECOLOGY

## 2024-07-15 PROCEDURE — 87086 URINE CULTURE/COLONY COUNT: CPT | Performed by: OBSTETRICS & GYNECOLOGY

## 2024-07-15 PROCEDURE — G0432 EIA HIV-1/HIV-2 SCREEN: HCPCS | Performed by: OBSTETRICS & GYNECOLOGY

## 2024-07-15 PROCEDURE — 87340 HEPATITIS B SURFACE AG IA: CPT | Performed by: OBSTETRICS & GYNECOLOGY

## 2024-07-15 PROCEDURE — 87661 TRICHOMONAS VAGINALIS AMPLIF: CPT | Performed by: OBSTETRICS & GYNECOLOGY

## 2024-07-15 PROCEDURE — 86780 TREPONEMA PALLIDUM: CPT | Performed by: OBSTETRICS & GYNECOLOGY

## 2024-07-15 PROCEDURE — 87491 CHLMYD TRACH DNA AMP PROBE: CPT | Performed by: OBSTETRICS & GYNECOLOGY

## 2024-07-15 PROCEDURE — 86850 RBC ANTIBODY SCREEN: CPT | Performed by: OBSTETRICS & GYNECOLOGY

## 2024-07-15 PROCEDURE — 87591 N.GONORRHOEAE DNA AMP PROB: CPT | Performed by: OBSTETRICS & GYNECOLOGY

## 2024-07-15 PROCEDURE — 83020 HEMOGLOBIN ELECTROPHORESIS: CPT | Performed by: OBSTETRICS & GYNECOLOGY

## 2024-07-15 PROCEDURE — 86803 HEPATITIS C AB TEST: CPT | Performed by: OBSTETRICS & GYNECOLOGY

## 2024-07-15 PROCEDURE — 86762 RUBELLA ANTIBODY: CPT | Performed by: OBSTETRICS & GYNECOLOGY

## 2024-07-15 NOTE — ASSESSMENT & PLAN NOTE
Continue prenatal vitamins  Check prenatal labs  Reviewed dating ultrasound and finalized EDC  Schedule anatomy ultrasound  Discussed office visit schedule and call rotation  Reviewed medication safe in pregnancy  Declines prenatal genetic screening  Reviewed nutrition, exercise, and appropriate weight gain in pregnancy

## 2024-07-15 NOTE — ASSESSMENT & PLAN NOTE
Discussed medication such as Adderall in pregnancy.  While not expected to increase any risk of congenital anomalies, this drug is a stimulant and has been associated with low birthweight infants.  Recommend serial growth ultrasounds in the third trimester.

## 2024-07-16 LAB — BACTERIA SPEC AEROBE CULT: NO GROWTH

## 2024-07-17 LAB
HGB A MFR BLD ELPH: 97.2 % (ref 96.4–98.8)
HGB A2 MFR BLD ELPH: 2.8 % (ref 1.8–3.2)
HGB F MFR BLD ELPH: 0 % (ref 0–2)
HGB FRACT BLD-IMP: NORMAL
HGB S MFR BLD ELPH: 0 %
RUBV IGG SERPL IA-ACNC: 2.14 INDEX

## 2024-07-19 LAB
C TRACH RRNA CVX QL NAA+PROBE: NEGATIVE
CONV .: NORMAL
CYTOLOGIST CVX/VAG CYTO: NORMAL
CYTOLOGY CVX/VAG DOC CYTO: NORMAL
CYTOLOGY CVX/VAG DOC THIN PREP: NORMAL
DX ICD CODE: NORMAL
Lab: NORMAL
N GONORRHOEA RRNA CVX QL NAA+PROBE: NEGATIVE
OTHER STN SPEC: NORMAL
STAT OF ADQ CVX/VAG CYTO-IMP: NORMAL
T VAGINALIS RRNA SPEC QL NAA+PROBE: NEGATIVE

## 2024-07-25 DIAGNOSIS — F90.9 ATTENTION DEFICIT HYPERACTIVITY DISORDER (ADHD), UNSPECIFIED ADHD TYPE: ICD-10-CM

## 2024-07-26 RX ORDER — DEXTROAMPHETAMINE SACCHARATE, AMPHETAMINE ASPARTATE, DEXTROAMPHETAMINE SULFATE AND AMPHETAMINE SULFATE 2.5; 2.5; 2.5; 2.5 MG/1; MG/1; MG/1; MG/1
10 TABLET ORAL 2 TIMES DAILY
Qty: 60 TABLET | Refills: 0 | OUTPATIENT
Start: 2024-07-26

## 2024-07-26 NOTE — TELEPHONE ENCOUNTER
CONTROLLED MEDICATION REFILL REQUEST    STATE REGULATION APPT EVERY 3 MONTHS     UDS(URINE DRUG SCREEN) EVERY 6 MONTHS OR UP TO PROVIDER PREFERENCE      NEW NARC CONSENT EVERY YEAR      MEDICATION:   amphetamine-dextroamphetamine (ADDERALL) 10 MG tablet (07/23/2024)  amphetamine-dextroamphetamine (ADDERALL) 10 MG tablet (06/24/2024)    TOO SOON FOR REFILL      NEXT OFFICE VISIT: Appointment with Eddie Linares MD (09/16/2024)     LAST OFFICE VISIT: Office Visit with Eddie Linares MD (06/24/2024)     NARC CONSENT: CONTROLLED SUBSTANCE AGREEMENT - SCAN - CONTROLLED SUBSTANCE AGREEMENT, BEHAVIORAL HEALTH, 11/03/2023 (11/03/2023)     URINE DRUG SCREEN(STANDING ORDER): Urine Drug Screen - Urine, Clean Catch (07/15/2024 10:16)     PROVIDER PLEASE ADVISE

## 2024-08-05 ENCOUNTER — TELEPHONE (OUTPATIENT)
Dept: OBSTETRICS AND GYNECOLOGY | Facility: CLINIC | Age: 34
End: 2024-08-05
Payer: OTHER GOVERNMENT

## 2024-09-03 DIAGNOSIS — F90.9 ATTENTION DEFICIT HYPERACTIVITY DISORDER (ADHD), UNSPECIFIED ADHD TYPE: ICD-10-CM

## 2024-09-04 RX ORDER — DEXTROAMPHETAMINE SACCHARATE, AMPHETAMINE ASPARTATE, DEXTROAMPHETAMINE SULFATE AND AMPHETAMINE SULFATE 2.5; 2.5; 2.5; 2.5 MG/1; MG/1; MG/1; MG/1
10 TABLET ORAL 2 TIMES DAILY
Qty: 60 TABLET | Refills: 0 | Status: SHIPPED | OUTPATIENT
Start: 2024-09-04

## 2024-09-04 RX ORDER — DEXTROAMPHETAMINE SACCHARATE, AMPHETAMINE ASPARTATE, DEXTROAMPHETAMINE SULFATE AND AMPHETAMINE SULFATE 2.5; 2.5; 2.5; 2.5 MG/1; MG/1; MG/1; MG/1
10 TABLET ORAL 2 TIMES DAILY
Qty: 60 TABLET | Refills: 0 | Status: CANCELLED | OUTPATIENT
Start: 2024-09-04

## 2024-09-04 NOTE — TELEPHONE ENCOUNTER
CONTROLLED MEDICATION REFILL REQUEST    STATE REGULATION APPT EVERY 3 MONTHS     UDS(URINE DRUG SCREEN) EVERY 6 MONTHS OR UP TO PROVIDER PREFERENCE      NEW NARC CONSENT EVERY YEAR      MEDICATION:   amphetamine-dextroamphetamine (ADDERALL) 10 MG tablet (07/23/2024)  amphetamine-dextroamphetamine (ADDERALL) 10 MG tablet (06/24/2024)     NEXT OFFICE VISIT: Appointment with Eddie Linares MD (09/16/2024)     LAST OFFICE VISIT: Office Visit with Eddie Linares MD (06/24/2024)     NARC CONSENT: CONTROLLED SUBSTANCE AGREEMENT - SCAN - CONTROLLED SUBSTANCE AGREEMENT, BEHAVIORAL HEALTH, 11/03/2023 (11/03/2023)     URINE DRUG SCREEN(STANDING ORDER): Urine Drug Screen - Urine, Clean Catch (07/15/2024 10:16)     PROVIDER PLEASE ADVISE    Secure message sent to Dr. Calli Andrade to report elevated HR. Awaiting response/orders. Pt did just ambulate to BR and return to bed. Will recheck HR once pt has settled in bed a few more minutes. No signs or symptoms of distress. Pt is reporting head pain at surgical incision site, rating 3/10. Medicated for pain with PRN tylenol, see MAR.

## 2024-09-16 ENCOUNTER — TELEPHONE (OUTPATIENT)
Dept: PSYCHIATRY | Facility: CLINIC | Age: 34
End: 2024-09-16

## 2024-09-16 ENCOUNTER — TELEMEDICINE (OUTPATIENT)
Dept: PSYCHIATRY | Facility: CLINIC | Age: 34
End: 2024-09-16
Payer: OTHER GOVERNMENT

## 2024-09-16 DIAGNOSIS — F90.9 ATTENTION DEFICIT HYPERACTIVITY DISORDER (ADHD), UNSPECIFIED ADHD TYPE: Primary | ICD-10-CM

## 2024-09-16 DIAGNOSIS — F33.41 MAJOR DEPRESSIVE DISORDER, RECURRENT, IN PARTIAL REMISSION: ICD-10-CM

## 2024-09-16 DIAGNOSIS — F33.41 MAJOR DEPRESSIVE DISORDER, RECURRENT EPISODE, IN PARTIAL REMISSION: ICD-10-CM

## 2024-09-16 DIAGNOSIS — F41.1 GAD (GENERALIZED ANXIETY DISORDER): ICD-10-CM

## 2024-09-16 PROCEDURE — 99214 OFFICE O/P EST MOD 30 MIN: CPT | Performed by: PSYCHIATRY & NEUROLOGY

## 2024-09-16 RX ORDER — DEXTROAMPHETAMINE SACCHARATE, AMPHETAMINE ASPARTATE, DEXTROAMPHETAMINE SULFATE AND AMPHETAMINE SULFATE 2.5; 2.5; 2.5; 2.5 MG/1; MG/1; MG/1; MG/1
10 TABLET ORAL 2 TIMES DAILY
Qty: 60 TABLET | Refills: 0 | Status: SHIPPED | OUTPATIENT
Start: 2024-11-01

## 2024-09-16 RX ORDER — BUPROPION HYDROCHLORIDE 300 MG/1
300 TABLET ORAL EVERY MORNING
Qty: 90 TABLET | Refills: 0 | Status: SHIPPED | OUTPATIENT
Start: 2024-09-16

## 2024-09-16 RX ORDER — DEXTROAMPHETAMINE SACCHARATE, AMPHETAMINE ASPARTATE, DEXTROAMPHETAMINE SULFATE AND AMPHETAMINE SULFATE 2.5; 2.5; 2.5; 2.5 MG/1; MG/1; MG/1; MG/1
10 TABLET ORAL 2 TIMES DAILY
Qty: 60 TABLET | Refills: 0 | Status: SHIPPED | OUTPATIENT
Start: 2024-10-04

## 2024-10-11 DIAGNOSIS — F90.9 ATTENTION DEFICIT HYPERACTIVITY DISORDER (ADHD), UNSPECIFIED ADHD TYPE: ICD-10-CM

## 2024-10-11 RX ORDER — DEXTROAMPHETAMINE SACCHARATE, AMPHETAMINE ASPARTATE, DEXTROAMPHETAMINE SULFATE AND AMPHETAMINE SULFATE 2.5; 2.5; 2.5; 2.5 MG/1; MG/1; MG/1; MG/1
10 TABLET ORAL 2 TIMES DAILY
Qty: 60 TABLET | Refills: 0 | Status: SHIPPED | OUTPATIENT
Start: 2024-10-11

## 2024-11-13 DIAGNOSIS — F90.9 ATTENTION DEFICIT HYPERACTIVITY DISORDER (ADHD), UNSPECIFIED ADHD TYPE: ICD-10-CM

## 2024-11-13 RX ORDER — DEXTROAMPHETAMINE SACCHARATE, AMPHETAMINE ASPARTATE, DEXTROAMPHETAMINE SULFATE AND AMPHETAMINE SULFATE 2.5; 2.5; 2.5; 2.5 MG/1; MG/1; MG/1; MG/1
10 TABLET ORAL 2 TIMES DAILY
Qty: 60 TABLET | Refills: 0 | Status: SHIPPED | OUTPATIENT
Start: 2024-11-13

## 2024-11-13 NOTE — TELEPHONE ENCOUNTER
REFILL REQUEST:     amphetamine-dextroamphetamine (ADDERALL) 10 MG tablet (11/01/2024)     F/UP- 12/09/2024.  LOV: 09/16/2024.    LAST UDS:  Urine Drug Screen - Urine, Clean Catch (07/15/2024 10:16)

## 2024-12-09 ENCOUNTER — OFFICE VISIT (OUTPATIENT)
Dept: PSYCHIATRY | Facility: CLINIC | Age: 34
End: 2024-12-09
Payer: OTHER GOVERNMENT

## 2024-12-09 VITALS
SYSTOLIC BLOOD PRESSURE: 126 MMHG | WEIGHT: 184 LBS | HEART RATE: 95 BPM | BODY MASS INDEX: 29.57 KG/M2 | HEIGHT: 66 IN | DIASTOLIC BLOOD PRESSURE: 86 MMHG | OXYGEN SATURATION: 98 %

## 2024-12-09 DIAGNOSIS — F41.1 GAD (GENERALIZED ANXIETY DISORDER): ICD-10-CM

## 2024-12-09 DIAGNOSIS — F33.41 MAJOR DEPRESSIVE DISORDER, RECURRENT EPISODE, IN PARTIAL REMISSION: ICD-10-CM

## 2024-12-09 DIAGNOSIS — F90.9 ATTENTION DEFICIT HYPERACTIVITY DISORDER (ADHD), UNSPECIFIED ADHD TYPE: Primary | ICD-10-CM

## 2024-12-09 PROCEDURE — 99214 OFFICE O/P EST MOD 30 MIN: CPT | Performed by: PSYCHIATRY & NEUROLOGY

## 2024-12-09 RX ORDER — BUPROPION HYDROCHLORIDE 300 MG/1
300 TABLET ORAL EVERY MORNING
Qty: 90 TABLET | Refills: 0 | Status: SHIPPED | OUTPATIENT
Start: 2024-12-09

## 2024-12-09 RX ORDER — DEXTROAMPHETAMINE SACCHARATE, AMPHETAMINE ASPARTATE, DEXTROAMPHETAMINE SULFATE AND AMPHETAMINE SULFATE 2.5; 2.5; 2.5; 2.5 MG/1; MG/1; MG/1; MG/1
10 TABLET ORAL 2 TIMES DAILY
Qty: 60 TABLET | Refills: 0 | Status: SHIPPED | OUTPATIENT
Start: 2025-01-10

## 2024-12-09 RX ORDER — DEXTROAMPHETAMINE SACCHARATE, AMPHETAMINE ASPARTATE, DEXTROAMPHETAMINE SULFATE AND AMPHETAMINE SULFATE 2.5; 2.5; 2.5; 2.5 MG/1; MG/1; MG/1; MG/1
10 TABLET ORAL 2 TIMES DAILY
Qty: 60 TABLET | Refills: 0 | Status: SHIPPED | OUTPATIENT
Start: 2024-12-13

## 2024-12-09 NOTE — PROGRESS NOTES
"[facility]  Follow-up Visit    Chief Complaint: \"Just having a hard time since the move... I lived in [place] for about 9 years, my  is ex-... I had therapy there... when he was out I couldn't go anymore... I had a community of friends and I was pretty involved with the community... I focused on a lot of wellness activities... to manage feeling overwhelmed... I had those resources available... and then we moved... I'm working in a different environment... I'm working third shift... it's been harder to find time to prioritize those things.\"     History of Present Illness: Rosemary Tesfaye is a 34 y.o. female who presents today for follow-up regarding MDD, MARTI, ADHD. Last seen: 09/16 at which time no changes were made to her regimen. Services today rendered via telehealth ([employer]) for which the patient provided informed consent. Patient is aware she can refuse to be seen remotely at any time. Patient was located in a secure, remote environment (in her stopped vehicle) as was the provider (in-office). I confirmed the patient's identity prior to evaluation and reiterated my credentials; there were no technical issues during the session today. She presents unaccompanied in no acute distress and engages with me appropriately. Psychotropic regimen perceived to be partially effective. Side-effects per given history: denies.    Current treatment regimen includes:   - bupropion 300 mg QAM  - amphetamine 10 mg BID    Today Rosemary reports she's doing okay, but is struggling at times to adjust to circumstances related to pregnancy. She's felt less able, more tired, more emotionally labile, and that she's been hard on herself about changes in her functional capacity and in her body image. She is doing well in her pregnancy with regard to physical progression, which continues to go according to plan. She sometimes feels like a burden to others because of the support they " provide her. She is at-term, but awaits delivery in short time. She is ready to give birth and move on to more regular routines or at least the capacity to engage with the day as she was prior to pregnancy. She'd like to defer regimen changes today. Depression symptoms are adequately managed with current interventions. Anxiety symptoms are adequately managed with current interventions. ADHD symptoms are adequately managed with current interventions; she continues taking medication holidays as recommended. Thought process and content are devoid of overt aberration suggestive of acute mona/psychosis. The patient denies SI/HI/AVH. There are no overt changes on exam today compared to most recent evaluation.  - contextual changes: continues enjoying working day shift and a difficult coworker is no longer working with her  - sleep: no changes reported  - appetite: no changes reported    I have counseled the patient with regard to diagnoses and the recommended treatment regimen as documented below. Patient acknowledges the diagnoses per my rendered interpretation. Patient demonstrates understanding of potential risks/benefits/side effects associated with this regimen and is amenable to proceed in this fashion.     Psychotherapy  - Time: N/A  - interventions employed: the therapeutic alliance was strengthened to encourage the patient to express their thoughts and feelings freely. Esteem building was enhanced through praise, reassurance, normalizing/challenging, and encouragement as appropriate. Coping skills were enhanced to build distress tolerance skills and emotional regulation. Allowed patient to freely discuss issues without interruption or judgement with unconditional positive regard, active listening skills, and empathy. Provided a safe, confidential environment to facilitate the development of a positive therapeutic relationship and encourage open, honest communication. Assisted patient in processing session  "content; acknowledged and normalized/addressed, as appropriate, patient’s thoughts, feelings, and concerns by utilizing a person-centered approach in efforts to build appropriate rapport and a positive therapeutic relationship with open and honest communication.   - Diagnoses: see assessment and plan below  - Symptoms: see subjective above  - Goals   - patient: improve mood, improve concentration/focus, mitigate overwhelm and bolster allostatic threshold, mitigate anxiety   - provider: challenge patterns of living conducive to pathology, strengthen defenses, promote problems solving, restore adaptive functioning and provide symptom relief.  - Treatment plan: continue supportive psychotherapy in subsequent appointments to provide symptom relief; see assessment and plan below for additional details:   - iteration: 1   - progress: at goal   - (X)illumination, (X)contextualization, (X)detection, (X)development, (X)elaboration, (working)refinement  - functional status: good  - mental status exam: as below  - prognosis: good    Psychiatric History:  Diagnoses: depression, anxiety  [facility] [employer] [facility]  Inpatient history: denies  Medication trials: \"I've always kind of rejected it... wanted to prioritize other forms of management... 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  Education: bachelor's degree  Pertinent developmental history: struggled some with reading, no formal LD or developmental issues; early exposure to items beyond " "her level of maturity, no direct abuse hx  Pertinent legal history: denies  Hobbies/interests: farming and raising/milking goats, gardening  [employer]ist: \"used to be, but no\" (this changed about two years ago); felt her earlene was around 7-8/10 non-Anglican Confucianist; gradual decline in this belief system for her thereafter  Exercise: not recently, but yes as of a few months ago  Dietary habits: defer  Sleep hygiene: defer  Social habits: hasn't had as much contact with friends or social opportunities since moving to [place]  Sunlight: no concern for under-exposure  Caffeine intake: 4 cups of coffee daily on average, tea in the winter, no soda, no energy drinks  Hydration habits: no pertinent issues   history: denies    Social History     Socioeconomic History    Marital status: Unknown   Tobacco Use    Smoking status: Never    Smokeless tobacco: Never   Vaping Use    Vaping status: Never Used   Substance and Sexual Activity    Alcohol use: Not Currently     Alcohol/week: 3.0 standard drinks of alcohol     Types: 3 Cans of beer per week    Drug use: Not Currently     Types: Marijuana    Sexual activity: Yes     Partners: Male     Tobacco use counseling/intervention: N/A, patient does not use tobacco; patient has been counseled with regard to risks of tobacco use.    PHQ-9 Depression Screening  PHQ-9 Total Score:      Little interest or pleasure in doing things?     Feeling down, depressed, or hopeless?     Trouble falling or staying asleep, or sleeping too much?     Feeling tired or having little energy?     Poor appetite or overeating?     Feeling bad about yourself - or that you are a failure or have let yourself or your family down?     Trouble concentrating on things, such as reading the newspaper or watching television?     Moving or speaking so slowly that other people could have noticed? Or the opposite - being so fidgety or restless that you have been moving around a lot more than usual?     Thoughts " that you would be better off dead, or of hurting yourself in some way?     PHQ-9 Total Score       Change in PHQ-9 since last measure: N/A (9)    MARTI-7       Change in MARTI-7 since last measure: N/A (6)    Problem List:  Patient Active Problem List   Diagnosis    Major depressive disorder, recurrent episode, in partial remission    MARTI (generalized anxiety disorder)    Attention deficit hyperactivity disorder (ADHD)    Supervision of other normal pregnancy, antepartum    Anxiety disorder affecting pregnancy, antepartum     Allergy:   No Known Allergies     Discontinued Medications:  Medications Discontinued During This Encounter   Medication Reason    buPROPion XL (WELLBUTRIN XL) 300 MG 24 hr tablet Reorder    amphetamine-dextroamphetamine (ADDERALL) 10 MG tablet Reorder       Current Medications:   Current Outpatient Medications   Medication Sig Dispense Refill    [START ON 12/13/2024] amphetamine-dextroamphetamine (ADDERALL) 10 MG tablet Take 1 tablet by mouth 2 (Two) Times a Day. 60 tablet 0    [START ON 1/10/2025] amphetamine-dextroamphetamine (ADDERALL) 10 MG tablet Take 1 tablet by mouth 2 (Two) Times a Day. 60 tablet 0    buPROPion XL (WELLBUTRIN XL) 300 MG 24 hr tablet Take 1 tablet by mouth Every Morning. 90 tablet 0     No current facility-administered medications for this visit.     Past Medical History:  Past Medical History:   Diagnosis Date    ADHD (attention deficit hyperactivity disorder) 10/2017    Was discussed as a diagnosis during session. I opted to avoid medicated treatment.    Anxiety 08/2017    Depression 08/2017    Had episode of Suicidal thoughts and made an appointment.  Attended bi-monthly Sessions with Therapist    Self-injurious behavior 2005    behavior continued for 2 years,     Past Surgical History:  Past Surgical History:   Procedure Laterality Date    COSMETIC SURGERY  04/2016     Mental Status Exam:   Appearance: well-groomed, sits upright, age-appropriate, gestational  "habitus  Behavior: calm, cooperative, appropriate in demeanor, appropriate eye-contact, intermittently tearful with difficult topics  Mood/affect: frustrated / mood-congruent and appropriate in both range and amplitude  Speech: within expected variance; appropriate rate, appropriate rhythm, appropriate tone; non-pressured  Thought Process: linear, goal-directed; no FOI or CRYSTAL; abstraction intact  Thought Content: coherent, devoid of overt delusions/perceptual disturbances  SI/HI: denies both SI and HI; exhibits future-orientation, self-advocates appropriately, no regular self-harm, no appreciable intent  Memory: no overt deficits  Orientation: oriented to person/place/time/situation  Concentration: appropriate during interview, +subjective deficits  Intellectual capacity: presumptively average  Insight: fair by given history/exam  Judgment: appropriate by given history/exam  Psychomotor: no appreciable latency/retardation/agitation/tremor  Gait: appropriate    Review of Systems   Constitutional:  Positive for fatigue.   Psychiatric/Behavioral:  Positive for agitation and sleep disturbance. The patient is nervous/anxious.      Vital Signs:   /86   Pulse 95   Ht 167.6 cm (66\")   Wt 83.5 kg (184 lb)   SpO2 98%   BMI 29.70 kg/m²      Lab Results:   Initial Prenatal on 07/15/2024   Component Date Value Ref Range Status    Urine Culture 07/15/2024 No growth   Final    Diagnosis 07/15/2024 Comment   Final    NEGATIVE FOR INTRAEPITHELIAL LESION OR MALIGNANCY.    Specimen adequacy: 07/15/2024 Comment   Final    Satisfactory for evaluation.  Endocervical and/or squamous metaplastic  cells (endocervical component) are present.    Clinician Provided ICD-10: 07/15/2024 Comment   Final    Z34.80    Performed by: 07/15/2024 Comment   Final    Nina Tipton, Cytotechnologist (ASCP)    . 07/15/2024 .   Final    Note: 07/15/2024 Comment   Final    The Pap smear is a screening test designed to aid in the detection " of  premalignant and malignant conditions of the uterine cervix.  It is not a  diagnostic procedure and should not be used as the sole means of detecting  cervical cancer.  Both false-positive and false-negative reports do occur.    Method: 07/15/2024 Comment   Final    This liquid based ThinPrep(R) pap test was screened with the  use of an image guided system.    Conv .conv 07/15/2024 Comment   Final    The HPV DNA reflex criteria were not met with this specimen result  therefore, no HPV testing was performed.    Chlamydia, Nuc. Acid Amp 07/15/2024 Negative  Negative Final    Gonococcus by Nucleic Acid Amp 07/15/2024 Negative  Negative Final    Trichomonas vaginosis 07/15/2024 Negative  Negative Final    Hgb F Quant 07/15/2024 0.0  0.0 - 2.0 % Final    Hgb A 07/15/2024 97.2  96.4 - 98.8 % Final    Hgb A2 Quant 07/15/2024 2.8  1.8 - 3.2 % Final    Hgb S 07/15/2024 0.0  0.0 % Final    Hgb Interp. 07/15/2024 Comment   Final    Normal hemoglobin present; no hemoglobin variant or beta thalassemia  identified.  Note: Alpha thalassemia may not be detected by the Hgb Fractionation  Cascade panel. If alpha thalassemia is suspected, [facility] offers  Alpha-Thalassemia DNA Analysis (#[id number]).    WBC 07/15/2024 9.93  3.40 - 10.80 10*3/mm3 Final    RBC 07/15/2024 4.45  3.77 - 5.28 10*6/mm3 Final    Hemoglobin 07/15/2024 13.5  12.0 - 15.9 g/dL Final    Hematocrit 07/15/2024 41.5  34.0 - 46.6 % Final    MCV 07/15/2024 93.3  79.0 - 97.0 fL Final    MCH 07/15/2024 30.3  26.6 - 33.0 pg Final    MCHC 07/15/2024 32.5  31.5 - 35.7 g/dL Final    RDW 07/15/2024 13.6  12.3 - 15.4 % Final    RDW-SD 07/15/2024 45.5  37.0 - 54.0 fl Final    MPV 07/15/2024 9.8  6.0 - 12.0 fL Final    Platelets 07/15/2024 189  140 - 450 10*3/mm3 Final    Neutrophil % 07/15/2024 75.9  42.7 - 76.0 % Final    Lymphocyte % 07/15/2024 16.3 (L)  19.6 - 45.3 % Final    Monocyte % 07/15/2024 4.6 (L)  5.0 - 12.0 % Final    Eosinophil % 07/15/2024 2.1  0.3 - 6.2 % Final     Basophil % 07/15/2024 0.3  0.0 - 1.5 % Final    Immature Grans % 07/15/2024 0.8 (H)  0.0 - 0.5 % Final    Neutrophils, Absolute 07/15/2024 7.53 (H)  1.70 - 7.00 10*3/mm3 Final    Lymphocytes, Absolute 07/15/2024 1.62  0.70 - 3.10 10*3/mm3 Final    Monocytes, Absolute 07/15/2024 0.46  0.10 - 0.90 10*3/mm3 Final    Eosinophils, Absolute 07/15/2024 0.21  0.00 - 0.40 10*3/mm3 Final    Basophils, Absolute 07/15/2024 0.03  0.00 - 0.20 10*3/mm3 Final    Immature Grans, Absolute 07/15/2024 0.08 (H)  0.00 - 0.05 10*3/mm3 Final    nRBC 07/15/2024 0.0  0.0 - 0.2 /100 WBC Final    Hepatitis B Surface Ag 07/15/2024 Non-Reactive  Non-Reactive Final    Rubella Antibodies, IgG 07/15/2024 2.14  Immune >0.99 index Final                                    Non-immune       <0.90                                  Equivocal  0.90 - 0.99                                  Immune           >0.99    ABO Type 07/15/2024 A   Final    RH type 07/15/2024 Positive   Final    Antibody Screen 07/15/2024 Negative   Final    HIV DUO 07/15/2024 Non-Reactive  Non-Reactive Final    Hepatitis C Ab 07/15/2024 Non-Reactive  Non-Reactive Final    Treponemal AB Total 07/15/2024 Non-Reactive  Non-Reactive Final    Amphet/Methamphet, Screen 07/15/2024 Negative  Negative Final    Barbiturates Screen, Urine 07/15/2024 Negative  Negative Final    Benzodiazepine Screen, Urine 07/15/2024 Negative  Negative Final    Cocaine Screen, Urine 07/15/2024 Negative  Negative Final    Opiate Screen 07/15/2024 Negative  Negative Final    THC, Screen, Urine 07/15/2024 Negative  Negative Final    Methadone Screen, Urine 07/15/2024 Negative  Negative Final    Oxycodone Screen, Urine 07/15/2024 Negative  Negative Final    Fentanyl, Urine 07/15/2024 Negative  Negative Final     EKG Results:  No orders to display     Imaging Results:  No Images in the past 120 days found.    ASSESSMENT AND PLAN:    ICD-10-CM ICD-9-CM   1. Attention deficit hyperactivity disorder (ADHD),  unspecified ADHD type  F90.9 314.01   2. Major depressive disorder, recurrent episode, in partial remission  F33.41 296.35   3. MARTI (generalized anxiety disorder)  F41.1 300.02     34 y.o. female who presents today for follow-up regarding MDD, MARTI, ADHD. We have discussed the interval history and the treatment plan below, including potential R/B/SE of the recommended regimen of which the patient demonstrates understanding. Patient is agreeable to call 911 or go to the nearest ER should she become concerned for her own safety and/or the safety of those around her. There are no overt indices of acute mona/psychosis on exam today.    Medication regimen: no change - continue amphetamine and bupropion; patient is advised not to misuse prescribed medications or to use them with any exogenous substances that aren't disclosed to this provider as they may interact with the regimen to the patient's detriment.   Risk assessment: protracted risk is low, imminent risk is low - no interval change. Do note that this is subject to change with the Mandaen of new stressors, treatment non-adherence, use of substances, and/or new medical ails.   Monitoring: reviewed labs as above  Therapy: referred  Follow-up: 2 months  Communications: N/A  Treatment plan: due; defer (at goal)    TREATMENT PLAN/GOALS: challenge patterns of living conducive to symptom burden, implement recommended regimen as above with augmentative, intermittent supportive psychotherapy to reduce symptom burden. Patient acknowledged and verbally consented to continue treatment. The importance of adherence to the recommended treatment and interval follow-up appointments was again emphasized today: patient has good treatment adherence per given history. Patient was today reminded to limit daily caffeine intake, hydrate appropriately, eat healthy and nutritious foods, engage sleep hygiene measures, engage appropriate exposure to sunlight, engage with hobbies in balance  with life necessities, and exercise appropriate to their capacity to do so.     Billing: This encounter is of moderate complexity based on number/complexity of problems addressed today and risk of complications/morbidity: 2+ stable chronic illnesses and prescription management.     Parts of this note are electronic transcriptions/translations of spoken language to printed text using the Dragon Dictation system.    Electronically signed by Eddie Linares MD, 12/09/24, [id number]

## 2025-01-24 DIAGNOSIS — F90.9 ATTENTION DEFICIT HYPERACTIVITY DISORDER (ADHD), UNSPECIFIED ADHD TYPE: ICD-10-CM

## 2025-01-24 RX ORDER — DEXTROAMPHETAMINE SACCHARATE, AMPHETAMINE ASPARTATE, DEXTROAMPHETAMINE SULFATE AND AMPHETAMINE SULFATE 2.5; 2.5; 2.5; 2.5 MG/1; MG/1; MG/1; MG/1
10 TABLET ORAL 2 TIMES DAILY
Qty: 60 TABLET | Refills: 0 | OUTPATIENT
Start: 2025-01-24

## 2025-01-24 NOTE — TELEPHONE ENCOUNTER
DUPLICATE REFILL REQUEST:     amphetamine-dextroamphetamine (ADDERALL) 10 MG tablet (01/10/2025)     THIS WAS ALREADY SENT INTO THE PHARMACY ON 01/10/2025.

## 2025-02-25 DIAGNOSIS — F90.9 ATTENTION DEFICIT HYPERACTIVITY DISORDER (ADHD), UNSPECIFIED ADHD TYPE: ICD-10-CM

## 2025-02-25 DIAGNOSIS — F33.41 MAJOR DEPRESSIVE DISORDER, RECURRENT EPISODE, IN PARTIAL REMISSION: ICD-10-CM

## 2025-02-25 RX ORDER — DEXTROAMPHETAMINE SACCHARATE, AMPHETAMINE ASPARTATE, DEXTROAMPHETAMINE SULFATE AND AMPHETAMINE SULFATE 2.5; 2.5; 2.5; 2.5 MG/1; MG/1; MG/1; MG/1
10 TABLET ORAL 2 TIMES DAILY
Qty: 60 TABLET | Refills: 0 | Status: SHIPPED | OUTPATIENT
Start: 2025-02-25

## 2025-02-25 RX ORDER — BUPROPION HYDROCHLORIDE 300 MG/1
300 TABLET ORAL EVERY MORNING
Qty: 90 TABLET | Refills: 0 | Status: SHIPPED | OUTPATIENT
Start: 2025-02-25

## 2025-02-25 NOTE — TELEPHONE ENCOUNTER
CONTROLLED MEDICATION REFILL REQUEST    STATE REGULATION APPT EVERY 3 MONTHS     UDS(URINE DRUG SCREEN) EVERY 6 MONTHS OR UP TO PROVIDER PREFERENCE   (ZABRINA HOUSTON & ALEX 1 PER YEAR)     NEW NARC CONSENT EVERY YEAR      MEDICATION: amphetamine-dextroamphetamine (ADDERALL) 10 MG tablet (01/10/2025)  buPROPion XL (WELLBUTRIN XL) 300 MG 24 hr tablet (12/09/2024)     NEXT OFFICE VISIT: Appointment with Eddie Linares MD (03/05/2025)     LAST OFFICE VISIT: Office Visit with Eddie Linares MD (12/09/2024)     NARC CONSENT: CONTROLLED SUBSTANCE AGREEMENT - SCAN - CONTROLLED SUBSTANCE AGREEMENT, BEHAVIORAL HEALTH, 11/03/2023 (11/03/2023)     URINE DRUG SCREEN(STANDING ORDER)   (ZABRINA JOHNSON 1 PER YEAR): Urine Drug Screen - Urine, Clean Catch (07/15/2024 10:16)     PROVIDER PLEASE ADVISE

## 2025-03-05 ENCOUNTER — TELEPHONE (OUTPATIENT)
Dept: PSYCHIATRY | Facility: CLINIC | Age: 35
End: 2025-03-05

## 2025-03-05 ENCOUNTER — TELEMEDICINE (OUTPATIENT)
Dept: PSYCHIATRY | Facility: CLINIC | Age: 35
End: 2025-03-05
Payer: OTHER GOVERNMENT

## 2025-03-05 DIAGNOSIS — F33.41 MAJOR DEPRESSIVE DISORDER, RECURRENT EPISODE, IN PARTIAL REMISSION: ICD-10-CM

## 2025-03-05 DIAGNOSIS — F41.1 GAD (GENERALIZED ANXIETY DISORDER): ICD-10-CM

## 2025-03-05 DIAGNOSIS — F90.9 ATTENTION DEFICIT HYPERACTIVITY DISORDER (ADHD), UNSPECIFIED ADHD TYPE: Primary | ICD-10-CM

## 2025-03-05 RX ORDER — DEXTROAMPHETAMINE SACCHARATE, AMPHETAMINE ASPARTATE, DEXTROAMPHETAMINE SULFATE AND AMPHETAMINE SULFATE 2.5; 2.5; 2.5; 2.5 MG/1; MG/1; MG/1; MG/1
10 TABLET ORAL 2 TIMES DAILY
Qty: 60 TABLET | Refills: 0 | Status: SHIPPED | OUTPATIENT
Start: 2025-04-24

## 2025-03-05 RX ORDER — DEXTROAMPHETAMINE SACCHARATE, AMPHETAMINE ASPARTATE, DEXTROAMPHETAMINE SULFATE AND AMPHETAMINE SULFATE 2.5; 2.5; 2.5; 2.5 MG/1; MG/1; MG/1; MG/1
10 TABLET ORAL 2 TIMES DAILY
Qty: 60 TABLET | Refills: 0 | Status: SHIPPED | OUTPATIENT
Start: 2025-03-25

## 2025-03-05 NOTE — PROGRESS NOTES
"[facility]  Follow-up Visit    Chief Complaint: \"Just having a hard time since the move... I lived in [place] for about 9 years, my  is ex-... I had therapy there... when he was out I couldn't go anymore... I had a community of friends and I was pretty involved with the community... I focused on a lot of wellness activities... to manage feeling overwhelmed... I had those resources available... and then we moved... I'm working in a different environment... I'm working third shift... it's been harder to find time to prioritize those things.\"     History of Present Illness: Rosemary Tesfaye is a 34 y.o. female who presents today for follow-up. Last seen by this practice: 12/09 at which time no changes were made to her regimen. Services today rendered via telehealth ([email or web address]) for which the patient provided informed consent. Patient is aware she can refuse to be seen remotely at any time. Patient was located in a secure, remote environment (in her stopped vehicle) as was the provider (in-office). I confirmed the patient's identity prior to evaluation and reiterated my credentials; there were no technical issues during the session today.    Current treatment regimen includes:   - bupropion 300 mg QAM  - amphetamine 10 mg BID    Rosemary presents on time and unaccompanied in no acute distress and engages with me appropriately. Today she reports she's doing well. Depressive, anxious, and ADHD symptoms are manageable with current interventions. She feels her current regimen conveys sufficient benefit, though does raise concerns about changes in the blood flow of her fingers and hands that usually abates within 30 minutes. This isn't sufficiently bothersome that she wishes to make a change in medication at this juncture; I have advised there are alternatives we can use if she so wishes and that she may call in the interim of visits if this becomes of more interest.  - " sleep: generally adequate  - appetite: at baseline  - medication adverse effects: hand clamminess (tolerable for the time being)    Interval History and Clinical Commentary:   Ego-syntonic. Rosemary presents in a fashion consistent with the spectrum of prior assessments with regard to MSE.    She gave birth and labor was a bit prolonged, but relatively uncomplicated otherwise. Her daughter - Vicenta - is healthy, was born 12/17. She is back to work and her employer has been accommodating. Her partner is staying with their daughter through the day.    Axis I: ADHD, MDD, MARTI  Axis II: defer  Axis III: defer  Axis IV: defer  Axis V: 75    Differential considerations: N/A.    Adherence:  Treatment adherence is appropriate; issues in this regard have included: N/A. Patient is advised not to misuse prescribed medications or to use them with any exogenous substances that aren't disclosed to this provider as they may interact with the regimen to the patient's detriment. The importance of adherence to the recommended treatment and interval follow-up appointments has been emphasized.     Education:  I have counseled Rosemary with regard to diagnoses and the recommended treatment regimen as documented below. I have advised that because medications can elicit idiosyncratic reactions in different individuals that SE may present in ways that haven't been discussed. I have reiterated the importance of discussing with me any clinical changes that could represent potential adverse effects regarding the medication regimen.    Patient acknowledges the diagnoses per my rendered interpretation. Patient is agreeable to call 911 or go to the nearest ER should she become concerned for her own safety and/or the safety of those around her. Patient demonstrates understanding of potential risks/benefits/side effects associated with the recommended treatment regimen and is amenable to proceed in the fashion outlined below. Patient has been encouraged  to cultivate constructive patterns of living including limiting daily caffeine intake, hydrating appropriately, regularly eating nutritious foods, engaging sleep hygiene practices, engaging in appropriate exposure to sunlight, engaging with hobbies in balance with life necessities, and exercising appropriate to their capacity to do so.    Risk:  There is no significant change to risk profile discernible during today's evaluation - do note that this is subject to change with the Adventism of new stressors, treatment non-adherence, use of substances, and/or new medical ails. There is no appreciable evidence of intent for harm to self or others. There are no appreciable indices of mona/psychosis.    Contraception and mitigation of potential teratogenicity: I have advised there are risks taking any medication during pregnancy and, unfortunately, these risks are poorly elaborated due to ethical concerns with studying medications in pregnant women. I have advised that in the case of pregnancy risk may be beyond what I'm able to advise and the general approach is that medication use should only be considered if potential benefits outweigh the possibility of risks by the patient's measure.    Psychotherapy:  - Time: 9 minutes  - interventions employed: the therapeutic alliance was strengthened to encourage the patient to express their thoughts and feelings freely. Esteem building was enhanced through praise, reassurance, normalizing/challenging, and encouragement as appropriate. Coping skills were enhanced to build distress tolerance skills and emotional regulation. Allowed patient to freely discuss issues without interruption or judgement with unconditional positive regard, active listening skills, and empathy. Provided a safe, confidential environment to facilitate the development of a positive therapeutic relationship and encourage open, honest communication. Assisted patient in processing session content; acknowledged and  "normalized/addressed, as appropriate, patient’s thoughts, feelings, and concerns by utilizing a person-centered approach in efforts to build appropriate rapport and a positive therapeutic relationship with open and honest communication.   - Diagnoses: see assessment and plan below  - Symptoms: see subjective above  - Goals              - patient: improve mood, improve concentration/focus, mitigate overwhelm and bolster allostatic threshold, mitigate anxiety              - provider: challenge patterns of living conducive to pathology, strengthen defenses, promote problems solving, restore adaptive functioning and provide symptom relief.  - Treatment plan: continue supportive psychotherapy in subsequent appointments to provide symptom relief; see assessment and plan below for additional details:              - iteration: 1              - progress: at goal              - (X)illumination, (X)contextualization, (X)detection, (X)development, (X)elaboration, (working)refinement  - functional status: good  - mental status exam: as below  - prognosis: good     Psychiatric History:  Diagnoses: depression, anxiety  [facility] [employer] [facility]  Inpatient history: denies  Medication trials: \"I've always kind of rejected it... wanted to prioritize other forms of management... 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  Education: bachelor's degree  Pertinent developmental history: struggled some with reading, no formal LD or developmental issues; " "early exposure to items beyond her level of maturity, no direct abuse hx  Pertinent legal history: denies  Hobbies/interests: farming and raising/milking goats, gardening  Roman Catholic: \"used to be, but no\" (this changed about two years ago); felt her earlene was around 7-8/10 non-Mandaeism Taoism; gradual decline in this belief system for her thereafter  Exercise: not recently, but yes as of a few months ago  Dietary habits: defer  Sleep hygiene: defer  Social habits: hasn't had as much contact with friends or social opportunities since moving to [place]  Sunlight: no concern for under-exposure  Caffeine intake: 4 cups of coffee daily on average, tea in the winter, no soda, no energy drinks  Hydration habits: no pertinent issues   history: denies    Social History     Socioeconomic History    Marital status: Unknown   Tobacco Use    Smoking status: Never    Smokeless tobacco: Never   Vaping Use    Vaping status: Never Used   Substance and Sexual Activity    Alcohol use: Not Currently     Alcohol/week: 3.0 standard drinks of alcohol     Types: 3 Cans of beer per week    Drug use: Not Currently     Types: Marijuana    Sexual activity: Yes     Partners: Male     Tobacco use counseling/intervention: N/A, patient does not use tobacco; patient has been counseled with regard to risks of tobacco use.    PHQ-9 Depression Screening  PHQ-9 Total Score:       Little interest or pleasure in doing things?     Feeling down, depressed, or hopeless?     PHQ-2 Total Score     Trouble falling or staying asleep, or sleeping too much?     Feeling tired or having little energy?     Poor appetite or overeating?     Feeling bad about yourself - or that you are a failure or have let yourself or your family down?     Trouble concentrating on things, such as reading the newspaper or watching television?     Moving or speaking so slowly that other people could have noticed? Or the opposite - being so fidgety or restless that you have " been moving around a lot more than usual?     Thoughts that you would be better off dead, or of hurting yourself in some way?     PHQ-9 Total Score     If you checked off any problems, how difficult have these problems made it for you to do your work, take care of things at home, or get along with other people?         Change in PHQ-9 since last measure: N/A (9)    MARTI-7       Change in MARTI-7 since last measure: N/A (6)    Problem List:  Patient Active Problem List   Diagnosis    Major depressive disorder, recurrent episode, in partial remission    MARTI (generalized anxiety disorder)    Attention deficit hyperactivity disorder (ADHD)    Supervision of other normal pregnancy, antepartum    Anxiety disorder affecting pregnancy, antepartum     Allergy:   No Known Allergies     Discontinued Medications:  Medications Discontinued During This Encounter   Medication Reason    amphetamine-dextroamphetamine (ADDERALL) 10 MG tablet     amphetamine-dextroamphetamine (ADDERALL) 10 MG tablet Reorder       Current Medications:   Current Outpatient Medications   Medication Sig Dispense Refill    [START ON 3/25/2025] amphetamine-dextroamphetamine (ADDERALL) 10 MG tablet Take 1 tablet by mouth 2 (Two) Times a Day. 60 tablet 0    [START ON 4/24/2025] amphetamine-dextroamphetamine (ADDERALL) 10 MG tablet Take 1 tablet by mouth 2 (Two) Times a Day. 60 tablet 0    buPROPion XL (WELLBUTRIN XL) 300 MG 24 hr tablet Take 1 tablet by mouth Every Morning. 90 tablet 0     No current facility-administered medications for this visit.     Past Medical History:  Past Medical History:   Diagnosis Date    ADHD (attention deficit hyperactivity disorder) 10/2017    Was discussed as a diagnosis during session. I opted to avoid medicated treatment.    Anxiety 08/2017    Depression 08/2017    Had episode of Suicidal thoughts and made an appointment.  Attended bi-monthly Sessions with Therapist    Self-injurious behavior 2005    behavior continued for 2  years,     Past Surgical History:  Past Surgical History:   Procedure Laterality Date    COSMETIC SURGERY  04/2016     Mental Status Exam:   Appearance: well-groomed, sits upright, age-appropriate, average habitus  Behavior: calm, cooperative, appropriate in demeanor, appropriate eye-contact  Mood/affect: euthymic / mood-congruent and appropriate in both range and amplitude  Speech: within expected variance; appropriate rate, appropriate rhythm, appropriate tone; non-pressured  Thought Process: linear, goal-directed; no FOI or CRYSTAL; abstraction intact  Thought Content: coherent, devoid of overt delusions/perceptual disturbances  SI/HI: denies both SI and HI; exhibits future-orientation, self-advocates appropriately, no regular self-harm, no appreciable intent  Memory: no overt deficits  Orientation: oriented to person/place/time/situation  Concentration: appropriate during interview  Intellectual capacity: presumptively average  Insight: fair by given history/exam  Judgment: appropriate by given history/exam  Psychomotor: no appreciable latency/retardation/agitation/tremor  Gait: defer    Review of Systems:  Review of Systems    Vital Signs:   There were no vitals taken for this visit.     Lab Results:   No visits with results within 6 Month(s) from this visit.   Latest known visit with results is:   Initial Prenatal on 07/15/2024   Component Date Value Ref Range Status    Urine Culture 07/15/2024 No growth   Final    Diagnosis 07/15/2024 Comment   Final    NEGATIVE FOR INTRAEPITHELIAL LESION OR MALIGNANCY.    Specimen adequacy: 07/15/2024 Comment   Final    Satisfactory for evaluation.  Endocervical and/or squamous metaplastic  cells (endocervical component) are present.    Clinician Provided ICD-10: 07/15/2024 Comment   Final    Z34.80    Performed by: 07/15/2024 Comment   Final    Nina Tipton, Cytotechnologist (ASCP)    . 07/15/2024 .   Final    Note: 07/15/2024 Comment   Final    The Pap smear is a screening test  designed to aid in the detection of  premalignant and malignant conditions of the uterine cervix.  It is not a  diagnostic procedure and should not be used as the sole means of detecting  cervical cancer.  Both false-positive and false-negative reports do occur.    Method: 07/15/2024 Comment   Final    This liquid based ThinPrep(R) pap test was screened with the  use of an image guided system.    Conv .conv 07/15/2024 Comment   Final    The HPV DNA reflex criteria were not met with this specimen result  therefore, no HPV testing was performed.    Chlamydia, Nuc. Acid Amp 07/15/2024 Negative  Negative Final    Gonococcus by Nucleic Acid Amp 07/15/2024 Negative  Negative Final    Trichomonas vaginosis 07/15/2024 Negative  Negative Final    Hgb F Quant 07/15/2024 0.0  0.0 - 2.0 % Final    Hgb A 07/15/2024 97.2  96.4 - 98.8 % Final    Hgb A2 Quant 07/15/2024 2.8  1.8 - 3.2 % Final    Hgb S 07/15/2024 0.0  0.0 % Final    Hgb Interp. 07/15/2024 Comment   Final    Normal hemoglobin present; no hemoglobin variant or beta thalassemia  identified.  Note: Alpha thalassemia may not be detected by the Hgb Fractionation  Cascade panel. If alpha thalassemia is suspected, [facility] offers  Alpha-Thalassemia DNA Analysis (#[id number]).    WBC 07/15/2024 9.93  3.40 - 10.80 10*3/mm3 Final    RBC 07/15/2024 4.45  3.77 - 5.28 10*6/mm3 Final    Hemoglobin 07/15/2024 13.5  12.0 - 15.9 g/dL Final    Hematocrit 07/15/2024 41.5  34.0 - 46.6 % Final    MCV 07/15/2024 93.3  79.0 - 97.0 fL Final    MCH 07/15/2024 30.3  26.6 - 33.0 pg Final    MCHC 07/15/2024 32.5  31.5 - 35.7 g/dL Final    RDW 07/15/2024 13.6  12.3 - 15.4 % Final    RDW-SD 07/15/2024 45.5  37.0 - 54.0 fl Final    MPV 07/15/2024 9.8  6.0 - 12.0 fL Final    Platelets 07/15/2024 189  140 - 450 10*3/mm3 Final    Neutrophil % 07/15/2024 75.9  42.7 - 76.0 % Final    Lymphocyte % 07/15/2024 16.3 (L)  19.6 - 45.3 % Final    Monocyte % 07/15/2024 4.6 (L)  5.0 - 12.0 % Final    Eosinophil %  07/15/2024 2.1  0.3 - 6.2 % Final    Basophil % 07/15/2024 0.3  0.0 - 1.5 % Final    Immature Grans % 07/15/2024 0.8 (H)  0.0 - 0.5 % Final    Neutrophils, Absolute 07/15/2024 7.53 (H)  1.70 - 7.00 10*3/mm3 Final    Lymphocytes, Absolute 07/15/2024 1.62  0.70 - 3.10 10*3/mm3 Final    Monocytes, Absolute 07/15/2024 0.46  0.10 - 0.90 10*3/mm3 Final    Eosinophils, Absolute 07/15/2024 0.21  0.00 - 0.40 10*3/mm3 Final    Basophils, Absolute 07/15/2024 0.03  0.00 - 0.20 10*3/mm3 Final    Immature Grans, Absolute 07/15/2024 0.08 (H)  0.00 - 0.05 10*3/mm3 Final    nRBC 07/15/2024 0.0  0.0 - 0.2 /100 WBC Final    Hepatitis B Surface Ag 07/15/2024 Non-Reactive  Non-Reactive Final    Rubella Antibodies, IgG 07/15/2024 2.14  Immune >0.99 index Final                                    Non-immune       <0.90                                  Equivocal  0.90 - 0.99                                  Immune           >0.99    ABO Type 07/15/2024 A   Final    RH type 07/15/2024 Positive   Final    Antibody Screen 07/15/2024 Negative   Final    HIV DUO 07/15/2024 Non-Reactive  Non-Reactive Final    Hepatitis C Ab 07/15/2024 Non-Reactive  Non-Reactive Final    Treponemal AB Total 07/15/2024 Non-Reactive  Non-Reactive Final    Amphet/Methamphet, Screen 07/15/2024 Negative  Negative Final    Barbiturates Screen, Urine 07/15/2024 Negative  Negative Final    Benzodiazepine Screen, Urine 07/15/2024 Negative  Negative Final    Cocaine Screen, Urine 07/15/2024 Negative  Negative Final    Opiate Screen 07/15/2024 Negative  Negative Final    THC, Screen, Urine 07/15/2024 Negative  Negative Final    Methadone Screen, Urine 07/15/2024 Negative  Negative Final    Oxycodone Screen, Urine 07/15/2024 Negative  Negative Final    Fentanyl, Urine 07/15/2024 Negative  Negative Final     EKG Results:  No orders to display     Imaging Results:  US OB FU Grwth or Reassmnt Transabd Only  Result Date: 11/29/2024  YOUR TEST RESULTS IN [facility] IS NOT A  SUBSTITUTE FOR DISCUSSING THOSE RESULTS WITH YOUR HEALTH CARE PROVIDER. PLEASE CONTACT YOUR PROVIDER VIA [employer] TO DISCUSS ANY QUESTIONS OR CONCERNS YOU MAY HAVE REGARDING THESE TEST RESULTS. OBSTETRICS REPORT                  RADIOLOGY REPORT    FACILITY:  [facility] ROOM NUMBER:   PATIENT NAME/:  EMMANUEL WALKER    1990 MRN NUMBER:  [id number] ACCOUNT NUMBER:  [id number] ACCESSION NUMBER:  [id number]     DATE OF EXAM:  2024 EXAMINATION(S):  US OB F/U INTERVAL GROWTH OR REASSMNT, TRANSABD ONLY       PERFORMED BY:     Attending:        Nan Gamino  Performed By:     Miguelina Ferro RDMS,RN, BSN  Referred By:      Misbah Gamino M.D.        ASSESSMENT AND PLAN:    ICD-10-CM ICD-9-CM   1. Attention deficit hyperactivity disorder (ADHD), unspecified ADHD type  F90.9 314.01   2. Major depressive disorder, recurrent episode, in partial remission  F33.41 296.35   3. MARTI (generalized anxiety disorder)  F41.1 300.02     34 y.o. female who presents today for follow-up. We have discussed the interval history and the treatment plan below:      Medication regimen: no change - continue amphetamine IR, bupropion XL   Monitoring: reviewed labs/imaging as populated above  Primary psychotherapy: referred  Follow-up: 3 months  Communications: N/A  Treatment plan: N/A (at goal)    TREATMENT PLAN/GOALS: challenge patterns of living conducive to symptom burden, implement recommended regimen as above with augmentative, intermittent supportive psychotherapy to reduce symptom burden. Patient acknowledged and verbally consented to continue treatment.      Billing: This encounter is of moderate complexity based on number/complexity of problems addressed today and risk of complications/morbidity: 2+ stable chronic illnesses and and prescription management.     Electronically signed by Eddie Linares MD, 25, [phone]

## 2025-03-06 ENCOUNTER — TELEPHONE (OUTPATIENT)
Dept: PSYCHIATRY | Facility: CLINIC | Age: 35
End: 2025-03-06
Payer: OTHER GOVERNMENT

## 2025-03-06 NOTE — TELEPHONE ENCOUNTER
PT LVM.    PT IS ASKING SWITCHING TO VYVANSE IN STEAD OF ADDERALL.    SHE WANTS TO SEE IF THIS MIGHT BE A BETTER FIT TO ALLEVIATE THE DROWSINESS AND NERVOUSNESS THAT SHE GETS FROM THE ADDERALL.

## 2025-03-07 NOTE — TELEPHONE ENCOUNTER
ATTEMPTED TO CONTACT PT(PATIENT)      UNABLE TO LEAVE A VOICEMAIL WITH INSTRUCTIONS TO RETURN CALL TO OFFICE AT [phone]

## 2025-03-09 DIAGNOSIS — F33.41 MAJOR DEPRESSIVE DISORDER, RECURRENT EPISODE, IN PARTIAL REMISSION: ICD-10-CM

## 2025-03-10 RX ORDER — BUPROPION HYDROCHLORIDE 300 MG/1
300 TABLET ORAL EVERY MORNING
Qty: 90 TABLET | Refills: 0 | OUTPATIENT
Start: 2025-03-10

## 2025-03-10 NOTE — TELEPHONE ENCOUNTER
NEXT VISIT WITH PROVIDER   Appointment with Eddie Linares MD (06/05/2025)     LAST SEEN BY PROVIDER   Telemedicine with Eddie Linares MD (03/05/2025)     LAST MED REFILL  buPROPion XL (WELLBUTRIN XL) 300 MG 24 hr tablet (02/25/2025)     TOO SOON FOR REFILL     PROVIDER PLEASE ADVISE

## 2025-03-10 NOTE — TELEPHONE ENCOUNTER
ATTEMPTED TO CONTACT PT(PATIENT)      NO ANSWER      LEFT VOICEMAIL WITH INSTRUCTIONS TO RETURN CALL TO OFFICE AT [phone]

## 2025-03-11 NOTE — TELEPHONE ENCOUNTER
ATTEMPTED TO CONTACT PT(PATIENT)      NO ANSWER      LEFT VOICEMAIL WITH INSTRUCTIONS TO RETURN CALL TO OFFICE AT [phone]

## 2025-05-20 ENCOUNTER — TELEPHONE (OUTPATIENT)
Dept: PSYCHIATRY | Facility: CLINIC | Age: 35
End: 2025-05-20
Payer: OTHER GOVERNMENT

## 2025-05-20 DIAGNOSIS — F90.9 ATTENTION DEFICIT HYPERACTIVITY DISORDER (ADHD), UNSPECIFIED ADHD TYPE: ICD-10-CM

## 2025-05-20 RX ORDER — DEXTROAMPHETAMINE SACCHARATE, AMPHETAMINE ASPARTATE, DEXTROAMPHETAMINE SULFATE AND AMPHETAMINE SULFATE 2.5; 2.5; 2.5; 2.5 MG/1; MG/1; MG/1; MG/1
10 TABLET ORAL
Qty: 30 TABLET | Refills: 0 | Status: SHIPPED | OUTPATIENT
Start: 2025-05-23

## 2025-05-20 RX ORDER — DEXTROAMPHETAMINE SACCHARATE, AMPHETAMINE ASPARTATE MONOHYDRATE, DEXTROAMPHETAMINE SULFATE AND AMPHETAMINE SULFATE 2.5; 2.5; 2.5; 2.5 MG/1; MG/1; MG/1; MG/1
10 CAPSULE, EXTENDED RELEASE ORAL EVERY MORNING
Qty: 30 CAPSULE | Refills: 0 | Status: SHIPPED | OUTPATIENT
Start: 2025-05-23

## 2025-05-20 NOTE — TELEPHONE ENCOUNTER
Pt wants to know if she can switch to an extended release adderall because she feels like she can't get through an entire 12 hour shift.  She says that she feels exhausted at the end of the day

## 2025-05-20 NOTE — TELEPHONE ENCOUNTER
We can. I'll send for an XR dose at the next fill that she can take in the morning and I'll send for her usual afternoon dose as well to see if we can achieve the right duration.

## 2025-05-23 DIAGNOSIS — F33.41 MAJOR DEPRESSIVE DISORDER, RECURRENT EPISODE, IN PARTIAL REMISSION: ICD-10-CM

## 2025-05-23 RX ORDER — BUPROPION HYDROCHLORIDE 300 MG/1
300 TABLET ORAL EVERY MORNING
Qty: 90 TABLET | Refills: 0 | Status: SHIPPED | OUTPATIENT
Start: 2025-05-23

## 2025-05-23 NOTE — TELEPHONE ENCOUNTER
NEXT VISIT WITH PROVIDER   Appointment with Eddie Linares MD (06/05/2025)     LAST SEEN BY PROVIDER   Telemedicine with Eddie Linares MD (03/05/2025)     LAST MED REFILL  buPROPion XL (WELLBUTRIN XL) 300 MG 24 hr tablet (02/25/2025)     PROVIDER PLEASE ADVISE

## 2025-06-05 ENCOUNTER — TELEMEDICINE (OUTPATIENT)
Dept: PSYCHIATRY | Facility: CLINIC | Age: 35
End: 2025-06-05
Payer: OTHER GOVERNMENT

## 2025-06-05 DIAGNOSIS — F33.41 MAJOR DEPRESSIVE DISORDER, RECURRENT EPISODE, IN PARTIAL REMISSION: ICD-10-CM

## 2025-06-05 DIAGNOSIS — F41.1 GAD (GENERALIZED ANXIETY DISORDER): ICD-10-CM

## 2025-06-05 DIAGNOSIS — F90.9 ATTENTION DEFICIT HYPERACTIVITY DISORDER (ADHD), UNSPECIFIED ADHD TYPE: Primary | ICD-10-CM

## 2025-06-05 RX ORDER — DEXTROAMPHETAMINE SACCHARATE, AMPHETAMINE ASPARTATE MONOHYDRATE, DEXTROAMPHETAMINE SULFATE AND AMPHETAMINE SULFATE 2.5; 2.5; 2.5; 2.5 MG/1; MG/1; MG/1; MG/1
10 CAPSULE, EXTENDED RELEASE ORAL EVERY MORNING
Qty: 30 CAPSULE | Refills: 0 | Status: SHIPPED | OUTPATIENT
Start: 2025-06-20

## 2025-06-05 RX ORDER — DEXTROAMPHETAMINE SACCHARATE, AMPHETAMINE ASPARTATE, DEXTROAMPHETAMINE SULFATE AND AMPHETAMINE SULFATE 2.5; 2.5; 2.5; 2.5 MG/1; MG/1; MG/1; MG/1
10 TABLET ORAL
Qty: 30 TABLET | Refills: 0 | Status: SHIPPED | OUTPATIENT
Start: 2025-06-20

## 2025-06-05 NOTE — PROGRESS NOTES
"[facility]  Follow-up Visit    Chief Complaint: \"Just having a hard time since the move... I lived in [place] for about 9 years, my  is ex-... I had therapy there... when he was out I couldn't go anymore... I had a community of friends and I was pretty involved with the community... I focused on a lot of wellness activities... to manage feeling overwhelmed... I had those resources available... and then we moved... I'm working in a different environment... I'm working third shift... it's been harder to find time to prioritize those things.\"     History of Present Illness: Rosemary Tesfaye is a 34 y.o. female who presents today for follow-up. Last seen by this practice: 03/05 at which time no changes were made to her regimen. There has since been a change in the amphetamine regimen. Services today rendered via telehealth ([employer]) for which the patient provided informed consent. Patient is aware she can refuse to be seen remotely at any time. Patient was located in a secure, remote environment (at work) as was the provider (in-office). I confirmed the patient's identity prior to evaluation and reiterated my credentials; there were no technical issues during the session today.    Current treatment regimen includes:   - bupropion 300 mg QAM  - amphetamine XR 10 mg QAM + amphetamine 10 mg QPM    Rosemary presents on time and unaccompanied in no acute distress and engages with me appropriately. Today she reports she's doing well. Depressive, anxious, and ADHD symptoms are manageable with current interventions. She feels her current regimen conveys sufficient benefit.   - sleep: generally adequate  - appetite: at baseline  - medication adverse effects: hand clamminess (tolerable for the time being)    Interval History and Clinical Commentary:   Ego-syntonic. Rosemary presents in a fashion consistent with the spectrum of prior assessments with regard to MSE.    She feels " this post-partum period has been far more manageable compared to the last. They are building a tree house and this has been nice. Work is going well.    She is considering getting back into school, may want to study to become a PA. She is nervous about this, but excited to see how it plays out.    Axis I: ADHD, MDD, MARTI  Axis II: defer  Axis III: defer  Axis IV: defer  Axis V: 75    Differential considerations: N/A.    Adherence:  Treatment adherence is appropriate; issues in this regard have included: N/A. Patient is advised not to misuse prescribed medications or to use them with any exogenous substances that aren't disclosed to this provider as they may interact with the regimen to the patient's detriment. The importance of adherence to the recommended treatment and interval follow-up appointments has been emphasized.     Education:  I have counseled Rosemary with regard to diagnoses and the recommended treatment regimen as documented below. I have advised that because medications can elicit idiosyncratic reactions in different individuals that SE may present in ways that haven't been discussed. I have reiterated the importance of discussing with me any clinical changes that could represent potential adverse effects regarding the medication regimen.    Patient acknowledges the diagnoses per my rendered interpretation. Patient is agreeable to call 911 or go to the nearest ER should she become concerned for her own safety and/or the safety of those around her. Patient demonstrates understanding of potential risks/benefits/side effects associated with the recommended treatment regimen and is amenable to proceed in the fashion outlined below. Patient has been encouraged to cultivate constructive patterns of living including limiting daily caffeine intake, hydrating appropriately, regularly eating nutritious foods, engaging sleep hygiene practices, engaging in appropriate exposure to sunlight, engaging with hobbies in  balance with life necessities, and exercising appropriate to their capacity to do so.    Risk:  There is no significant change to risk profile discernible during today's evaluation - do note that this is subject to change with the Yarsanism of new stressors, treatment non-adherence, use of substances, and/or new medical ails. There is no appreciable evidence of intent for harm to self or others. There are no appreciable indices of mona/psychosis.    Contraception and mitigation of potential teratogenicity: I have advised there are risks taking any medication during pregnancy and, unfortunately, these risks are poorly elaborated due to ethical concerns with studying medications in pregnant women. I have advised that in the case of pregnancy risk may be beyond what I'm able to advise and the general approach is that medication use should only be considered if potential benefits outweigh the possibility of risks by the patient's measure.    Psychotherapy:  - Time: 10 minutes  - interventions employed: the therapeutic alliance was strengthened to encourage the patient to express their thoughts and feelings freely. Esteem building was enhanced through praise, reassurance, normalizing/challenging, and encouragement as appropriate. Coping skills were enhanced to build distress tolerance skills and emotional regulation. Allowed patient to freely discuss issues without interruption or judgement with unconditional positive regard, active listening skills, and empathy. Provided a safe, confidential environment to facilitate the development of a positive therapeutic relationship and encourage open, honest communication. Assisted patient in processing session content; acknowledged and normalized/addressed, as appropriate, patient’s thoughts, feelings, and concerns by utilizing a person-centered approach in efforts to build appropriate rapport and a positive therapeutic relationship with open and honest communication.   -  "Diagnoses: see assessment and plan below  - Symptoms: see subjective above  - Goals              - patient: improve mood, improve concentration/focus, mitigate overwhelm and bolster allostatic threshold, mitigate anxiety              - provider: challenge patterns of living conducive to pathology, strengthen defenses, promote problems solving, restore adaptive functioning and provide symptom relief.  - Treatment plan: continue supportive psychotherapy in subsequent appointments to provide symptom relief; see assessment and plan below for additional details:              - iteration: 1              - progress: at goal              - (X)illumination, (X)contextualization, (X)detection, (X)development, (X)elaboration, (working)refinement  - functional status: good  - mental status exam: as below  - prognosis: good     Psychiatric History:  Diagnoses: depression, anxiety  [facility] [employer] [facility]  Inpatient history: denies  Medication trials: \"I've always kind of rejected it... wanted to prioritize other forms of management... 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  Education: bachelor's degree  Pertinent developmental history: struggled some with reading, no formal LD or developmental issues; early exposure to items beyond her level of maturity, no direct abuse hx  Pertinent legal history: denies  Hobbies/interests: farming and raising/milking goats, gardening  Taoist: \"used to be, but no\" (this changed about two years ago); " felt her earlene was around 7-8/10 non-[employer]; gradual decline in this belief system for her thereafter  Exercise: not recently, but yes as of a few months ago  Dietary habits: defer  Sleep hygiene: defer  Social habits: hasn't had as much contact with friends or social opportunities since moving to [place]  Sunlight: no concern for under-exposure  Caffeine intake: 4 cups of coffee daily on average, tea in the winter, no soda, no energy drinks  Hydration habits: no pertinent issues   history: denies    Social History     Socioeconomic History    Marital status: Unknown   Tobacco Use    Smoking status: Never    Smokeless tobacco: Never   Vaping Use    Vaping status: Never Used   Substance and Sexual Activity    Alcohol use: Not Currently     Alcohol/week: 3.0 standard drinks of alcohol     Types: 3 Cans of beer per week    Drug use: Not Currently     Types: Marijuana    Sexual activity: Yes     Partners: Male     Tobacco use counseling/intervention: N/A, patient does not use tobacco; patient has been counseled with regard to risks of tobacco use.    PHQ-9 Depression Screening  PHQ-9 Total Score:  (Patient-Rptd) 4    Little interest or pleasure in doing things? (Patient-Rptd) Not at all   Feeling down, depressed, or hopeless? (Patient-Rptd) Several days   PHQ-2 Total Score (Patient-Rptd) 1   Trouble falling or staying asleep, or sleeping too much? (Patient-Rptd) Several days   Feeling tired or having little energy? (Patient-Rptd) Several days   Poor appetite or overeating? (Patient-Rptd) Not at all   Feeling bad about yourself - or that you are a failure or have let yourself or your family down? (Patient-Rptd) Not at all   Trouble concentrating on things, such as reading the newspaper or watching television? (Patient-Rptd) Several days   Moving or speaking so slowly that other people could have noticed? Or the opposite - being so fidgety or restless that you have been moving around a lot more  than usual? (Patient-Rptd) Not at all   Thoughts that you would be better off dead, or of hurting yourself in some way? (Patient-Rptd) Not at all   PHQ-9 Total Score (Patient-Rptd) 4   If you checked off any problems, how difficult have these problems made it for you to do your work, take care of things at home, or get along with other people? (Patient-Rptd) Somewhat difficult       Change in PHQ-9 since last measure: -5 (9)    MARTI-7  Feeling nervous, anxious or on edge: (Patient-Rptd) Several days  Not being able to stop or control worrying: (Patient-Rptd) Several days  Worrying too much about different things: (Patient-Rptd) Several days  Trouble Relaxing: (Patient-Rptd) Not at all  Being so restless that it is hard to sit still: (Patient-Rptd) Not at all  Feeling afraid as if something awful might happen: (Patient-Rptd) Not at all  Becoming easily annoyed or irritable: (Patient-Rptd) Not at all  MARTI 7 Total Score: (Patient-Rptd) 3  If you checked any problems, how difficult have these problems made it for you to do your work, take care of things at home, or get along with other people: (Patient-Rptd) Not difficult at all    Change in MARTI-7 since last measure: -3 (6)    Problem List:  Patient Active Problem List   Diagnosis    Major depressive disorder, recurrent episode, in partial remission    MARTI (generalized anxiety disorder)    Attention deficit hyperactivity disorder (ADHD)    Supervision of other normal pregnancy, antepartum    Anxiety disorder affecting pregnancy, antepartum     Allergy:   No Known Allergies     Discontinued Medications:  There are no discontinued medications.    Current Medications:   Current Outpatient Medications   Medication Sig Dispense Refill    amphetamine-dextroamphetamine (ADDERALL) 10 MG tablet Take 1 tablet by mouth Every Afternoon. 30 tablet 0    amphetamine-dextroamphetamine XR (Adderall XR) 10 MG 24 hr capsule Take 1 capsule by mouth Every Morning 30 capsule 0    buPROPion XL  (WELLBUTRIN XL) 300 MG 24 hr tablet TAKE 1 TABLET BY MOUTH IN THE MORNING 90 tablet 0     No current facility-administered medications for this visit.     Past Medical History:  Past Medical History:   Diagnosis Date    ADHD (attention deficit hyperactivity disorder) 10/2017    Was discussed as a diagnosis during session. I opted to avoid medicated treatment.    Anxiety 08/2017    Depression 08/2017    Had episode of Suicidal thoughts and made an appointment.  Attended bi-monthly Sessions with Therapist    Self-injurious behavior 2005    behavior continued for 2 years,     Past Surgical History:  Past Surgical History:   Procedure Laterality Date    COSMETIC SURGERY  04/2016     Mental Status Exam:   Appearance: well-groomed, sits upright, age-appropriate, average habitus  Behavior: calm, cooperative, appropriate in demeanor, appropriate eye-contact  Mood/affect: euthymic / mood-congruent and appropriate in both range and amplitude  Speech: within expected variance; appropriate rate, appropriate rhythm, appropriate tone; non-pressured  Thought Process: linear, goal-directed; no FOI or CRYSTAL; abstraction intact  Thought Content: coherent, devoid of overt delusions/perceptual disturbances  SI/HI: denies both SI and HI; exhibits future-orientation, self-advocates appropriately, no regular self-harm, no appreciable intent  Memory: no overt deficits  Orientation: oriented to person/place/time/situation  Concentration: appropriate during interview  Intellectual capacity: presumptively average  Insight: fair by given history/exam  Judgment: appropriate by given history/exam  Psychomotor: no appreciable latency/retardation/agitation/tremor  Gait: defer    Review of Systems:  Review of Systems    Vital Signs:   There were no vitals taken for this visit.     Lab Results:   No visits with results within 6 Month(s) from this visit.   Latest known visit with results is:   Initial Prenatal on 07/15/2024   Component Date Value Ref  Range Status    Urine Culture 07/15/2024 No growth   Final    Diagnosis 07/15/2024 Comment   Final    NEGATIVE FOR INTRAEPITHELIAL LESION OR MALIGNANCY.    Specimen adequacy: 07/15/2024 Comment   Final    Satisfactory for evaluation.  Endocervical and/or squamous metaplastic  cells (endocervical component) are present.    Clinician Provided ICD-10: 07/15/2024 Comment   Final    Z34.80    Performed by: 07/15/2024 Comment   Final    Nina Tipton, Cytotechnologist (ASCP)    . 07/15/2024 .   Final    Note: 07/15/2024 Comment   Final    The Pap smear is a screening test designed to aid in the detection of  premalignant and malignant conditions of the uterine cervix.  It is not a  diagnostic procedure and should not be used as the sole means of detecting  cervical cancer.  Both false-positive and false-negative reports do occur.    Method: 07/15/2024 Comment   Final    This liquid based ThinPrep(R) pap test was screened with the  use of an image guided system.    Conv .conv 07/15/2024 Comment   Final    The HPV DNA reflex criteria were not met with this specimen result  therefore, no HPV testing was performed.    Chlamydia, Nuc. Acid Amp 07/15/2024 Negative  Negative Final    Gonococcus by Nucleic Acid Amp 07/15/2024 Negative  Negative Final    Trichomonas vaginosis 07/15/2024 Negative  Negative Final    Hgb F Quant 07/15/2024 0.0  0.0 - 2.0 % Final    Hgb A 07/15/2024 97.2  96.4 - 98.8 % Final    Hgb A2 Quant 07/15/2024 2.8  1.8 - 3.2 % Final    Hgb S 07/15/2024 0.0  0.0 % Final    Hgb Interp. 07/15/2024 Comment   Final    Normal hemoglobin present; no hemoglobin variant or beta thalassemia  identified.  Note: Alpha thalassemia may not be detected by the Hgb Fractionation  Cascade panel. If alpha thalassemia is suspected, [facility] offers  Alpha-Thalassemia DNA Analysis (#[id number]).    WBC 07/15/2024 9.93  3.40 - 10.80 10*3/mm3 Final    RBC 07/15/2024 4.45  3.77 - 5.28 10*6/mm3 Final    Hemoglobin 07/15/2024 13.5  12.0 - 15.9  g/dL Final    Hematocrit 07/15/2024 41.5  34.0 - 46.6 % Final    MCV 07/15/2024 93.3  79.0 - 97.0 fL Final    MCH 07/15/2024 30.3  26.6 - 33.0 pg Final    MCHC 07/15/2024 32.5  31.5 - 35.7 g/dL Final    RDW 07/15/2024 13.6  12.3 - 15.4 % Final    RDW-SD 07/15/2024 45.5  37.0 - 54.0 fl Final    MPV 07/15/2024 9.8  6.0 - 12.0 fL Final    Platelets 07/15/2024 189  140 - 450 10*3/mm3 Final    Neutrophil % 07/15/2024 75.9  42.7 - 76.0 % Final    Lymphocyte % 07/15/2024 16.3 (L)  19.6 - 45.3 % Final    Monocyte % 07/15/2024 4.6 (L)  5.0 - 12.0 % Final    Eosinophil % 07/15/2024 2.1  0.3 - 6.2 % Final    Basophil % 07/15/2024 0.3  0.0 - 1.5 % Final    Immature Grans % 07/15/2024 0.8 (H)  0.0 - 0.5 % Final    Neutrophils, Absolute 07/15/2024 7.53 (H)  1.70 - 7.00 10*3/mm3 Final    Lymphocytes, Absolute 07/15/2024 1.62  0.70 - 3.10 10*3/mm3 Final    Monocytes, Absolute 07/15/2024 0.46  0.10 - 0.90 10*3/mm3 Final    Eosinophils, Absolute 07/15/2024 0.21  0.00 - 0.40 10*3/mm3 Final    Basophils, Absolute 07/15/2024 0.03  0.00 - 0.20 10*3/mm3 Final    Immature Grans, Absolute 07/15/2024 0.08 (H)  0.00 - 0.05 10*3/mm3 Final    nRBC 07/15/2024 0.0  0.0 - 0.2 /100 WBC Final    Hepatitis B Surface Ag 07/15/2024 Non-Reactive  Non-Reactive Final    Rubella Antibodies, IgG 07/15/2024 2.14  Immune >0.99 index Final                                    Non-immune       <0.90                                  Equivocal  0.90 - 0.99                                  Immune           >0.99    ABO Type 07/15/2024 A   Final    RH type 07/15/2024 Positive   Final    Antibody Screen 07/15/2024 Negative   Final    HIV DUO 07/15/2024 Non-Reactive  Non-Reactive Final    Hepatitis C Ab 07/15/2024 Non-Reactive  Non-Reactive Final    Treponemal AB Total 07/15/2024 Non-Reactive  Non-Reactive Final    Amphet/Methamphet, Screen 07/15/2024 Negative  Negative Final    Barbiturates Screen, Urine 07/15/2024 Negative  Negative Final    Benzodiazepine Screen,  Urine 07/15/2024 Negative  Negative Final    Cocaine Screen, Urine 07/15/2024 Negative  Negative Final    Opiate Screen 07/15/2024 Negative  Negative Final    THC, Screen, Urine 07/15/2024 Negative  Negative Final    Methadone Screen, Urine 07/15/2024 Negative  Negative Final    Oxycodone Screen, Urine 07/15/2024 Negative  Negative Final    Fentanyl, Urine 07/15/2024 Negative  Negative Final     EKG Results:  No orders to display     Imaging Results:  No Images in the past 120 days found.    ASSESSMENT AND PLAN:    ICD-10-CM ICD-9-CM   1. Attention deficit hyperactivity disorder (ADHD), unspecified ADHD type  F90.9 314.01   2. MARTI (generalized anxiety disorder)  F41.1 300.02   3. Major depressive disorder, recurrent episode, in partial remission  F33.41 296.35     34 y.o. female who presents today for follow-up. We have discussed the interval history and the treatment plan below:      Medication regimen: no change - continue amphetamines XR+IR, bupropion XL   Monitoring: reviewed labs/imaging as populated above  Primary psychotherapy: referred  Follow-up: 3 months  Communications: N/A  Treatment plan: N/A (at goal)    TREATMENT PLAN/GOALS: challenge patterns of living conducive to symptom burden, implement recommended regimen as above with augmentative, intermittent supportive psychotherapy to reduce symptom burden. Patient acknowledged and verbally consented to continue treatment.      Billing: This encounter is of moderate complexity based on number/complexity of problems addressed today and risk of complications/morbidity: 2+ stable chronic illnesses and and prescription management.     Electronically signed by Eddie Linares MD, 06/05/25, 1638

## 2025-07-24 DIAGNOSIS — F33.41 MAJOR DEPRESSIVE DISORDER, RECURRENT EPISODE, IN PARTIAL REMISSION: ICD-10-CM

## 2025-07-24 RX ORDER — BUPROPION HYDROCHLORIDE 300 MG/1
300 TABLET ORAL EVERY MORNING
Qty: 30 TABLET | Refills: 2 | Status: SHIPPED | OUTPATIENT
Start: 2025-07-24

## 2025-07-24 NOTE — TELEPHONE ENCOUNTER
NEXT VISIT WITH PROVIDER Appointment with Eddie Linares MD (09/05/2025)     LAST SEEN BY PROVIDER Telemedicine with Eddie Linares MD (06/05/2025)     LAST MED REFILLbuPROPion XL (WELLBUTRIN XL) 300 MG 24 hr tablet (05/23/2025)     PROVIDER PLEASE ADVISE

## 2025-07-29 DIAGNOSIS — F90.9 ATTENTION DEFICIT HYPERACTIVITY DISORDER (ADHD), UNSPECIFIED ADHD TYPE: ICD-10-CM

## 2025-07-30 RX ORDER — DEXTROAMPHETAMINE SACCHARATE, AMPHETAMINE ASPARTATE MONOHYDRATE, DEXTROAMPHETAMINE SULFATE AND AMPHETAMINE SULFATE 2.5; 2.5; 2.5; 2.5 MG/1; MG/1; MG/1; MG/1
10 CAPSULE, EXTENDED RELEASE ORAL EVERY MORNING
Qty: 30 CAPSULE | Refills: 0 | Status: SHIPPED | OUTPATIENT
Start: 2025-07-30

## 2025-07-30 RX ORDER — DEXTROAMPHETAMINE SACCHARATE, AMPHETAMINE ASPARTATE, DEXTROAMPHETAMINE SULFATE AND AMPHETAMINE SULFATE 2.5; 2.5; 2.5; 2.5 MG/1; MG/1; MG/1; MG/1
10 TABLET ORAL
Qty: 30 TABLET | Refills: 0 | Status: SHIPPED | OUTPATIENT
Start: 2025-07-30

## 2025-07-30 NOTE — TELEPHONE ENCOUNTER
MEDICATION: amphetamine-dextroamphetamine XR (Adderall XR) 10 MG 24 hr capsule (06/20/2025)     amphetamine-dextroamphetamine (ADDERALL) 10 MG tablet (06/20/2025)       NEXT OFFICE VISIT: Appointment with Eddie Linares MD (09/05/2025)     LAST OFFICE VISIT: Telemedicine with Eddie Linares MD (06/05/2025)     NARC CONSENT: CONTROLLED SUBSTANCE AGREEMENT - SCAN - CONTROLLED SUBSTANCE AGREEMENT, BEHAVIORAL HEALTH, 11/03/2023 (11/03/2023)     URINE DRUG SCREEN(STANDING ORDER)   (ZABRINA [place] & ALEX 1 PER YEAR): Urine Drug Screen - Urine, Clean Catch (11/07/2023 11:34)     PROVIDER PLEASE ADVISE